# Patient Record
Sex: MALE | Race: BLACK OR AFRICAN AMERICAN | Employment: FULL TIME | URBAN - METROPOLITAN AREA
[De-identification: names, ages, dates, MRNs, and addresses within clinical notes are randomized per-mention and may not be internally consistent; named-entity substitution may affect disease eponyms.]

---

## 2020-11-27 ENCOUNTER — HOSPITAL ENCOUNTER (EMERGENCY)
Age: 50
Discharge: LEFT BEFORE DISCHARGE | DRG: 871 | End: 2020-11-27
Attending: EMERGENCY MEDICINE
Payer: COMMERCIAL

## 2020-11-27 ENCOUNTER — APPOINTMENT (OUTPATIENT)
Dept: CT IMAGING | Age: 50
DRG: 871 | End: 2020-11-27
Payer: COMMERCIAL

## 2020-11-27 ENCOUNTER — APPOINTMENT (OUTPATIENT)
Dept: GENERAL RADIOLOGY | Age: 50
DRG: 871 | End: 2020-11-27
Payer: COMMERCIAL

## 2020-11-27 VITALS
BODY MASS INDEX: 37.42 KG/M2 | RESPIRATION RATE: 20 BRPM | OXYGEN SATURATION: 96 % | HEART RATE: 115 BPM | SYSTOLIC BLOOD PRESSURE: 145 MMHG | TEMPERATURE: 102.7 F | WEIGHT: 246.91 LBS | DIASTOLIC BLOOD PRESSURE: 99 MMHG | HEIGHT: 68 IN

## 2020-11-27 LAB
A/G RATIO: 1 (ref 1.1–2.2)
ALBUMIN SERPL-MCNC: 3.9 G/DL (ref 3.4–5)
ALP BLD-CCNC: 61 U/L (ref 40–129)
ALT SERPL-CCNC: 19 U/L (ref 10–40)
ANION GAP SERPL CALCULATED.3IONS-SCNC: 8 MMOL/L (ref 3–16)
AST SERPL-CCNC: 24 U/L (ref 15–37)
BASOPHILS ABSOLUTE: 0.1 K/UL (ref 0–0.2)
BASOPHILS RELATIVE PERCENT: 1 %
BILIRUB SERPL-MCNC: 0.8 MG/DL (ref 0–1)
BUN BLDV-MCNC: 15 MG/DL (ref 7–20)
CALCIUM SERPL-MCNC: 8.7 MG/DL (ref 8.3–10.6)
CHLORIDE BLD-SCNC: 101 MMOL/L (ref 99–110)
CO2: 26 MMOL/L (ref 21–32)
CREAT SERPL-MCNC: 1.1 MG/DL (ref 0.9–1.3)
D DIMER: 291 NG/ML DDU (ref 0–229)
EKG ATRIAL RATE: 108 BPM
EKG DIAGNOSIS: NORMAL
EKG P AXIS: 50 DEGREES
EKG P-R INTERVAL: 172 MS
EKG Q-T INTERVAL: 322 MS
EKG QRS DURATION: 86 MS
EKG QTC CALCULATION (BAZETT): 431 MS
EKG R AXIS: 20 DEGREES
EKG T AXIS: 190 DEGREES
EKG VENTRICULAR RATE: 108 BPM
EOSINOPHILS ABSOLUTE: 0 K/UL (ref 0–0.6)
EOSINOPHILS RELATIVE PERCENT: 0.1 %
GFR AFRICAN AMERICAN: >60
GFR NON-AFRICAN AMERICAN: >60
GLOBULIN: 3.9 G/DL
GLUCOSE BLD-MCNC: 155 MG/DL (ref 70–99)
HCT VFR BLD CALC: 40.8 % (ref 40.5–52.5)
HEMOGLOBIN: 14.1 G/DL (ref 13.5–17.5)
LACTIC ACID, SEPSIS: 1.1 MMOL/L (ref 0.4–1.9)
LYMPHOCYTES ABSOLUTE: 1.2 K/UL (ref 1–5.1)
LYMPHOCYTES RELATIVE PERCENT: 20.6 %
MCH RBC QN AUTO: 32.5 PG (ref 26–34)
MCHC RBC AUTO-ENTMCNC: 34.4 G/DL (ref 31–36)
MCV RBC AUTO: 94.4 FL (ref 80–100)
MONOCYTES ABSOLUTE: 0.2 K/UL (ref 0–1.3)
MONOCYTES RELATIVE PERCENT: 4 %
NEUTROPHILS ABSOLUTE: 4.3 K/UL (ref 1.7–7.7)
NEUTROPHILS RELATIVE PERCENT: 74.3 %
PDW BLD-RTO: 13.3 % (ref 12.4–15.4)
PLATELET # BLD: 155 K/UL (ref 135–450)
PMV BLD AUTO: 7.5 FL (ref 5–10.5)
POTASSIUM REFLEX MAGNESIUM: 3.6 MMOL/L (ref 3.5–5.1)
RBC # BLD: 4.33 M/UL (ref 4.2–5.9)
SODIUM BLD-SCNC: 135 MMOL/L (ref 136–145)
TOTAL PROTEIN: 7.8 G/DL (ref 6.4–8.2)
TROPONIN: <0.01 NG/ML
WBC # BLD: 5.7 K/UL (ref 4–11)

## 2020-11-27 PROCEDURE — 83605 ASSAY OF LACTIC ACID: CPT

## 2020-11-27 PROCEDURE — 99285 EMERGENCY DEPT VISIT HI MDM: CPT

## 2020-11-27 PROCEDURE — 80053 COMPREHEN METABOLIC PANEL: CPT

## 2020-11-27 PROCEDURE — 85379 FIBRIN DEGRADATION QUANT: CPT

## 2020-11-27 PROCEDURE — 96365 THER/PROPH/DIAG IV INF INIT: CPT

## 2020-11-27 PROCEDURE — 93005 ELECTROCARDIOGRAM TRACING: CPT | Performed by: EMERGENCY MEDICINE

## 2020-11-27 PROCEDURE — 6360000002 HC RX W HCPCS: Performed by: EMERGENCY MEDICINE

## 2020-11-27 PROCEDURE — 87040 BLOOD CULTURE FOR BACTERIA: CPT

## 2020-11-27 PROCEDURE — 93010 ELECTROCARDIOGRAM REPORT: CPT | Performed by: INTERNAL MEDICINE

## 2020-11-27 PROCEDURE — 6360000004 HC RX CONTRAST MEDICATION: Performed by: EMERGENCY MEDICINE

## 2020-11-27 PROCEDURE — 85025 COMPLETE CBC W/AUTO DIFF WBC: CPT

## 2020-11-27 PROCEDURE — 2580000003 HC RX 258: Performed by: EMERGENCY MEDICINE

## 2020-11-27 PROCEDURE — 6370000000 HC RX 637 (ALT 250 FOR IP): Performed by: EMERGENCY MEDICINE

## 2020-11-27 PROCEDURE — 84484 ASSAY OF TROPONIN QUANT: CPT

## 2020-11-27 PROCEDURE — 71045 X-RAY EXAM CHEST 1 VIEW: CPT

## 2020-11-27 PROCEDURE — 71260 CT THORAX DX C+: CPT

## 2020-11-27 RX ORDER — BENZONATATE 100 MG/1
100 CAPSULE ORAL 3 TIMES DAILY PRN
Status: DISCONTINUED | OUTPATIENT
Start: 2020-11-27 | End: 2020-11-27 | Stop reason: HOSPADM

## 2020-11-27 RX ORDER — PROMETHAZINE HYDROCHLORIDE 25 MG/ML
12.5 INJECTION, SOLUTION INTRAMUSCULAR; INTRAVENOUS ONCE
Status: DISCONTINUED | OUTPATIENT
Start: 2020-11-27 | End: 2020-11-27

## 2020-11-27 RX ORDER — ALBUTEROL SULFATE 90 UG/1
2 AEROSOL, METERED RESPIRATORY (INHALATION) 4 TIMES DAILY PRN
Qty: 1 INHALER | Refills: 0 | Status: SHIPPED | OUTPATIENT
Start: 2020-11-27

## 2020-11-27 RX ORDER — METHYLPREDNISOLONE 4 MG/1
TABLET ORAL
Qty: 1 KIT | Refills: 0 | Status: ON HOLD | OUTPATIENT
Start: 2020-11-27 | End: 2020-12-06 | Stop reason: HOSPADM

## 2020-11-27 RX ORDER — 0.9 % SODIUM CHLORIDE 0.9 %
1000 INTRAVENOUS SOLUTION INTRAVENOUS ONCE
Status: COMPLETED | OUTPATIENT
Start: 2020-11-27 | End: 2020-11-27

## 2020-11-27 RX ORDER — AZITHROMYCIN 500 MG/1
500 TABLET, FILM COATED ORAL ONCE
Status: COMPLETED | OUTPATIENT
Start: 2020-11-27 | End: 2020-11-27

## 2020-11-27 RX ORDER — AZITHROMYCIN 250 MG/1
TABLET, FILM COATED ORAL
Qty: 1 PACKET | Refills: 0 | Status: ON HOLD | OUTPATIENT
Start: 2020-11-27 | End: 2020-12-06 | Stop reason: HOSPADM

## 2020-11-27 RX ORDER — CEFUROXIME AXETIL 250 MG/1
250 TABLET ORAL 2 TIMES DAILY
Qty: 14 TABLET | Refills: 0 | Status: ON HOLD | OUTPATIENT
Start: 2020-11-27 | End: 2020-12-06 | Stop reason: HOSPADM

## 2020-11-27 RX ADMIN — IOPAMIDOL 75 ML: 755 INJECTION, SOLUTION INTRAVENOUS at 11:04

## 2020-11-27 RX ADMIN — AZITHROMYCIN MONOHYDRATE 500 MG: 500 TABLET ORAL at 12:16

## 2020-11-27 RX ADMIN — CEFTRIAXONE 1 G: 1 INJECTION, POWDER, FOR SOLUTION INTRAMUSCULAR; INTRAVENOUS at 12:17

## 2020-11-27 RX ADMIN — BENZONATATE 100 MG: 100 CAPSULE ORAL at 09:49

## 2020-11-27 RX ADMIN — SODIUM CHLORIDE 1000 ML: 9 INJECTION, SOLUTION INTRAVENOUS at 09:57

## 2020-11-27 SDOH — HEALTH STABILITY: MENTAL HEALTH: HOW OFTEN DO YOU HAVE A DRINK CONTAINING ALCOHOL?: NEVER

## 2020-11-27 ASSESSMENT — PAIN DESCRIPTION - LOCATION: LOCATION: LEG

## 2020-11-27 ASSESSMENT — PAIN SCALES - GENERAL: PAINLEVEL_OUTOF10: 5

## 2020-11-27 ASSESSMENT — PAIN DESCRIPTION - DESCRIPTORS: DESCRIPTORS: SORE

## 2020-11-27 ASSESSMENT — PAIN DESCRIPTION - ORIENTATION: ORIENTATION: RIGHT;LEFT

## 2020-11-27 NOTE — ED NOTES
D/C: Order noted for d/c. Pt confirmed d/c paperwork 4 rx have correct name. Discharge and education instructions reviewed with patient. Teach-back successful. Pt verbalized understanding and signed d/c papers. Pt denied questions at this time. No acute distress noted. Patient instructed to follow-up as noted - return to emergency department if symptoms worsen. Patient verbalized understanding. Discharged per EDMD with discharge instructions. Pt discharged to private vehicle. Patient stable upon departure. AMA form signed by pt, this nurse and Kassie Sotelo RN as witnesses. Thanked patient for choosing [facility]) for care. Provider aware of patient pain at time of discharge.         Leena Leonardo RN  11/27/20 2337 Ruel Guido RN  11/27/20 0887

## 2020-11-27 NOTE — ED PROVIDER NOTES
[address] [place] [address]      Pt Name: Evelyn Vargas  MRN: [id number]  [place] 1970  Date of evaluation: 11/27/2020  Provider: Edna Silva, [facility]       Chief Complaint   Patient presents with    Cough     tested + for COVID on Tuesday and keeps \"coughing, coughing, coughing\"         HISTORY OF PRESENT ILLNESS   (Location/Symptom, Timing/Onset, Context/Setting, Quality, Duration, Modifying Factors, Severity)  Note limiting factors. Evelyn Vargas is a 48 y.o. male who presents to the emergency department with a complaint of coughing and cough paroxysms. He states that this morning he was unable to stop coughing. He denies any associated shortness of breath or dyspnea on exertion. He does complain of a little bit of pleuritic pain when he coughs. He is known to have COVID-19. He dusted positive as an outpatient on Tuesday, 3 days ago. His symptoms began 7 days ago, last Saturday. Initially he had body aches and generally did not feel well and then developed a cough a couple of days later which has persisted. He denies any sputum production. He has had low-grade fevers throughout his illness. He complains of some persistent body aches. He denies any abdominal pain nausea vomiting or diarrhea. Urine output is normal.  He denies any dysuria hematuria frequency urgency. He denies any headache, earache, sore throat, sinus drainage, neck pain, neck stiffness, or loss of taste or smell. He denies any history of COPD or asthma. He does have hypertension. He states that he is also known in the past to have protein in his urine which has been worked up but the cause is unknown according to the patient. He does not smoke. He denies any history of coronary artery disease or thromboembolic disease. Nursing Notes were reviewed.     HPI        REVIEW OF SYSTEMS    (2-9 systems for level 4, 10 or more for level 5)       HENT: Negative for rhinorrhea and sore throat. Eyes: Negative for redness or drainage. Gastrointestinal: Negative for abdominal pain. Negative for vomiting or diarrhea. Genitourinary: Negative for flank pain. Negative for dysuria. Negative for hematuria. Neurological: Negative for headache. All systems are reviewed and are negative except for those listed above in the history of present illness and ROS. PAST MEDICAL HISTORY     Past Medical History:   Diagnosis Date    Hypertension          SURGICAL HISTORY     History reviewed. No pertinent surgical history. CURRENT MEDICATIONS       Previous Medications    No medications on file       ALLERGIES     Patient has no known allergies. FAMILY HISTORY     History reviewed. No pertinent family history.        SOCIAL HISTORY       Social History     Socioeconomic History    Marital status: Single     Spouse name: None    Number of children: None    Years of education: None    Highest education level: None   Occupational History    None   Social Needs    Financial resource strain: None    Food insecurity     Worry: None     Inability: None    Transportation needs     Medical: None     Non-medical: None   Tobacco Use    Smoking status: Former Smoker    Smokeless tobacco: Never Used   Substance and Sexual Activity    Alcohol use: Not Currently     Frequency: Never    Drug use: Never    Sexual activity: None   Lifestyle    Physical activity     Days per week: None     Minutes per session: None    Stress: None   Relationships    Social connections     Talks on phone: None     Gets together: None     Attends Sabianism service: None     Active member of club or organization: None     Attends meetings of clubs or organizations: None     Relationship status: None    Intimate partner violence     Fear of current or ex partner: None     Emotionally abused: None     Physically abused: None     Forced sexual activity: None   Other Topics Concern    None   Social History Narrative    None       SCREENINGS             PHYSICAL EXAM    (up to 7 for level 4, 8 or more for level 5)     ED Triage Vitals [11/27/20 0926]   BP Temp Temp Source Pulse Resp SpO2 Height Weight   (!) 151/83 97.7 °F (36.5 °C) Oral 109 16 96 % 5' 8\" (1.727 m) 246 lb 14.6 oz (112 kg)         Physical Exam   Constitutional: Awake and alert. Nontoxic. Not ill-appearing. Head: No visible evidence of trauma. Normocephalic. Eyes: Pupils equal and reactive. No photophobia. Conjunctiva normal.    HENT: Oral mucosa moist.  Airway patent. Pharynx without erythema. Nares were clear. Neck:  Soft and supple. Nontender. Heart:  Regular rate and rhythm. No murmur. Lungs:  Clear to auscultation. No wheezes, rales, or ronchi. Coughing frequently. No conversational dyspnea or accessory muscle use. Chest: Chest wall non-tender. No evidence of trauma. Abdomen:  Soft, nondistended, bowel sounds present. Nontender. No guarding rigidity or rebound. No masses. Musculoskeletal: Extremities non-tender with full range of motion. Radial and dorsalis pedis pulses were intact. No calf tenderness erythema or edema. Neurological: Alert and oriented x 3. Speech clear. No facial droop. No acute focal motor or sensory deficits. Skin: Skin is warm and dry. No rash. Lymphatic:  No lympadenopathy. Psychiatric: Normal mood and affect. Behavior is normal.         DIAGNOSTIC RESULTS     EKG: All EKG's are interpreted by the Emergency Department Physician who either signs or Co-signs this chart in the absence of a cardiologist.    Sinus tachycardia. Rate 108. NJ interval 172 ms. QRS duration 86 ms. QTc 431 ms. Axis XX degrees. No ST elevation. T wave inversion noted in the lateral leads. Early repolarization. Left ventricular hypertrophy. There are no old EKGs available for comparison.     RADIOLOGY:   Non-plain film images such as CT, Ultrasound and MRI are read by the radiologist. Plain radiographic images are visualized and preliminarily interpreted by the emergency physician with the below findings:        Interpretation per the Radiologist below, if available at the time of this note:    CT CHEST PULMONARY EMBOLISM W CONTRAST   Preliminary Result   Negative study for pulmonary embolism. Scattered areas of ground-glass opacity, many of which have a somewhat   rounded appearance, to the lungs bilaterally involving all lobes. Many of   these also demonstrate superimposed crazy paving pattern. Commonly reported   imaging features of COVID-19 pneumonia are present. Other processes such as   influenza pneumonia and organizing pneumonia, as can be seen with drug   toxicity and connective tissue disease, can cause a similar imaging pattern. PneTyp      Small pericardial effusion. XR CHEST PORTABLE   Preliminary Result   1. Moderate cardiomegaly which has increased from previous exam of October 2015. No evidence of pulmonary edema. 2. Suspected subtle patchy peripheral opacities involving the right lung   which could indicate an evolving pneumonia. Radiographic follow-up   recommended.                ED BEDSIDE ULTRASOUND:   Performed by ED Physician - none    LABS:  Labs Reviewed   COMPREHENSIVE METABOLIC PANEL W/ REFLEX TO MG FOR LOW K - Abnormal; Notable for the following components:       Result Value    Sodium 135 (*)     Glucose 155 (*)     Albumin/Globulin Ratio 1.0 (*)     All other components within normal limits    Narrative:     Performed at:  [facility]  [address] [place], [place] [address]   Phone [phone]   D-DIMER, QUANTITATIVE - Abnormal; Notable for the following components:    D-Dimer, Quant 291 (*)     All other components within normal limits    Narrative:     Performed at:  [facility]  [address] [place], [place] [address]   Phone [phone]   CULTURE, BLOOD 1 CULTURE, BLOOD 2   CBC WITH AUTO DIFFERENTIAL    Narrative:     Performed at:  [facility]  [address] [place] [address]   Phone [phone]   TROPONIN    Narrative:     Performed at:  [facility]  [address] [place] [address]   Phone [phone]   LACTATE, SEPSIS    Narrative:     Performed at:  [facility]  [address]  [place] [facility] [address]   Phone [phone]   LACTATE, SEPSIS       All other labs were within normal range or not returned as of this dictation. EMERGENCY DEPARTMENT COURSE and DIFFERENTIAL DIAGNOSIS/MDM:   Vitals:    Vitals:    11/27/20 1120 11/27/20 1121 11/27/20 1122 11/27/20 1123   BP: (!) 158/99      Pulse:       Resp:       Temp:       TempSrc:       SpO2: 98% 97% 96% 96%   Weight:       Height:             MDM      The patient presents with cough and cough paroxysms with persistent symptoms after recently been diagnosed with Covid. He is mildly tachycardic with a heart rate of 109. Oxygen saturation is 96%. He is alert and oriented x3. Lungs are clear to auscultation and there is no evidence of any respiratory distress, conversational dyspnea, tachypnea, or accessory muscle use. He is coughing occasionally. He was given Tessalon, 1 tablet in the emergency department. He was also given IV fluids for hydration. REASSESSMENT          10:53 AM: Chest x-ray is suspicious for groundglass infiltrates consistent with COVID-19. D-dimer is elevated. CT chest PE protocol was ordered. 11:36 AM: CT chest was reviewed. There is no evidence of pulmonary embolus. There are atypical appearing infiltrates present in all lobes consistent with multifocal pneumonia. This is consistent with COVID-19. Patient was reexamined. He remains tachycardic with a heart rate of 110. Lactate is 1.1. Troponin is normal.  The patient was reexamined.     I advised the patient to be admitted for IV antibiotics, observation, further treatment and evaluation based on risk of decompensation, given the presence of multifocal pneumonia. He was adamant that he does not want to be admitted. He is competent to make this decision. He demonstrated appropriate decision-making capacity. I advised him of the risk of leaving [address] including but not limited to the possibility of, worsening of his condition, permanent disability, respiratory failure, and/or death. Verbalized understanding of the risk. He states that he will return if he develops any chest pain shortness of breath or worsening symptoms. He will be placed on a Medrol Dosepak, Ceftin, Zithromax. He will also be given a prescription for albuterol and Tessalon. You are advised to self isolate for 14 days or until COVID-19 test is known to be negative. You are advised to stay home and do not leave the house unless absolutely necessary. If you do need to leave the house for an urgent reason, you must wear a mask at all times. Follow-up with a primary care physician by telephone within 1-2 business days to discuss whether or not you need a in person follow-up visit. Make sure that you wear a mask if a follow-up visit is required. Make sure that you review the COVID-19 isolation instructions and COVID-19 general instructions that are attached. Watch for chest pain, shortness of breath, or worsening symptoms. If condition worsens or new symptoms develop, return immediately to the emergency department. Drink plenty of fluids. Recommend Tylenol or ibuprofen as directed for pain or fever. CRITICAL CARE TIME   Total Critical Care time was 0 minutes, excluding separately reportable procedures. There was a high probability of clinically significant/life threatening deterioration in the patient's condition which required my urgent intervention.       CONSULTS:  None    PROCEDURES:  Unless

## 2020-11-27 NOTE — ED TRIAGE NOTES
Pt arrived to ED via private vehicle for a complaint of cough. Pt states that he coughs up white mucus once in a while. Pt tested positive for COVID on Tuesday. Pt states that he has been coughing excessively since this morning. Pt states that he has mild pain in his chest from coughing that comes and goes. Pt denies any nausea or vomiting at this time. Pt denies any pertinent medical hx besides hypertension and is compliant with his medication. VS noted, blood pressure elevated and stable. Patient A&Ox4. Respirations easy and unlabored. Skin warm and dry and appropriate for ethnicity. No acute distress noted at this time. Patient placed on continuous telemetry and pulse ox upon arrival to room.

## 2020-11-30 ENCOUNTER — APPOINTMENT (OUTPATIENT)
Dept: GENERAL RADIOLOGY | Age: 50
DRG: 871 | End: 2020-11-30
Payer: COMMERCIAL

## 2020-11-30 ENCOUNTER — HOSPITAL ENCOUNTER (INPATIENT)
Age: 50
LOS: 6 days | Discharge: HOME | DRG: 871 | End: 2020-12-06
Attending: EMERGENCY MEDICINE | Admitting: INTERNAL MEDICINE
Payer: COMMERCIAL

## 2020-11-30 PROBLEM — J96.00 ACUTE RESPIRATORY FAILURE DUE TO COVID-19 (HCC): Status: ACTIVE | Noted: 2020-11-30

## 2020-11-30 PROBLEM — U07.1 ACUTE RESPIRATORY FAILURE DUE TO COVID-19 (HCC): Status: ACTIVE | Noted: 2020-11-30

## 2020-11-30 LAB
A/G RATIO: 0.8 (ref 1.1–2.2)
ALBUMIN SERPL-MCNC: 3.6 G/DL (ref 3.4–5)
ALP BLD-CCNC: 56 U/L (ref 40–129)
ALT SERPL-CCNC: 18 U/L (ref 10–40)
ANION GAP SERPL CALCULATED.3IONS-SCNC: 11 MMOL/L (ref 3–16)
AST SERPL-CCNC: 26 U/L (ref 15–37)
BASOPHILS ABSOLUTE: 0 K/UL (ref 0–0.2)
BASOPHILS RELATIVE PERCENT: 0.3 %
BILIRUB SERPL-MCNC: 0.6 MG/DL (ref 0–1)
BUN BLDV-MCNC: 24 MG/DL (ref 7–20)
C-REACTIVE PROTEIN: 65.1 MG/L (ref 0–5.1)
CALCIUM SERPL-MCNC: 9.2 MG/DL (ref 8.3–10.6)
CHLORIDE BLD-SCNC: 97 MMOL/L (ref 99–110)
CO2: 27 MMOL/L (ref 21–32)
CREAT SERPL-MCNC: 1.3 MG/DL (ref 0.9–1.3)
D DIMER: 548 NG/ML DDU (ref 0–229)
EOSINOPHILS ABSOLUTE: 0 K/UL (ref 0–0.6)
EOSINOPHILS RELATIVE PERCENT: 0 %
FERRITIN: 606.3 NG/ML (ref 30–400)
GFR AFRICAN AMERICAN: >60
GFR NON-AFRICAN AMERICAN: 58
GLOBULIN: 4.7 G/DL
GLUCOSE BLD-MCNC: 137 MG/DL (ref 70–99)
HCT VFR BLD CALC: 42 % (ref 40.5–52.5)
HEMOGLOBIN: 14.2 G/DL (ref 13.5–17.5)
LACTATE DEHYDROGENASE: 431 U/L (ref 100–190)
LACTIC ACID: 2 MMOL/L (ref 0.4–2)
LYMPHOCYTES ABSOLUTE: 0.7 K/UL (ref 1–5.1)
LYMPHOCYTES RELATIVE PERCENT: 5 %
MCH RBC QN AUTO: 32.1 PG (ref 26–34)
MCHC RBC AUTO-ENTMCNC: 33.9 G/DL (ref 31–36)
MCV RBC AUTO: 94.6 FL (ref 80–100)
MONOCYTES ABSOLUTE: 0.6 K/UL (ref 0–1.3)
MONOCYTES RELATIVE PERCENT: 4.3 %
NEUTROPHILS ABSOLUTE: 13 K/UL (ref 1.7–7.7)
NEUTROPHILS RELATIVE PERCENT: 90.4 %
PDW BLD-RTO: 12.9 % (ref 12.4–15.4)
PLATELET # BLD: 200 K/UL (ref 135–450)
PMV BLD AUTO: 8 FL (ref 5–10.5)
POTASSIUM REFLEX MAGNESIUM: 4 MMOL/L (ref 3.5–5.1)
PROCALCITONIN: 0.42 NG/ML (ref 0–0.15)
RBC # BLD: 4.43 M/UL (ref 4.2–5.9)
SODIUM BLD-SCNC: 135 MMOL/L (ref 136–145)
TOTAL PROTEIN: 8.3 G/DL (ref 6.4–8.2)
WBC # BLD: 14.3 K/UL (ref 4–11)

## 2020-11-30 PROCEDURE — 6360000002 HC RX W HCPCS: Performed by: INTERNAL MEDICINE

## 2020-11-30 PROCEDURE — 84145 PROCALCITONIN (PCT): CPT

## 2020-11-30 PROCEDURE — 99285 EMERGENCY DEPT VISIT HI MDM: CPT

## 2020-11-30 PROCEDURE — 96374 THER/PROPH/DIAG INJ IV PUSH: CPT

## 2020-11-30 PROCEDURE — 6370000000 HC RX 637 (ALT 250 FOR IP): Performed by: INTERNAL MEDICINE

## 2020-11-30 PROCEDURE — 2580000003 HC RX 258: Performed by: EMERGENCY MEDICINE

## 2020-11-30 PROCEDURE — 2500000003 HC RX 250 WO HCPCS: Performed by: EMERGENCY MEDICINE

## 2020-11-30 PROCEDURE — 71045 X-RAY EXAM CHEST 1 VIEW: CPT

## 2020-11-30 PROCEDURE — 80053 COMPREHEN METABOLIC PANEL: CPT

## 2020-11-30 PROCEDURE — 85025 COMPLETE CBC W/AUTO DIFF WBC: CPT

## 2020-11-30 PROCEDURE — 83615 LACTATE (LD) (LDH) ENZYME: CPT

## 2020-11-30 PROCEDURE — 96361 HYDRATE IV INFUSION ADD-ON: CPT

## 2020-11-30 PROCEDURE — 85379 FIBRIN DEGRADATION QUANT: CPT

## 2020-11-30 PROCEDURE — 2580000003 HC RX 258: Performed by: INTERNAL MEDICINE

## 2020-11-30 PROCEDURE — 86140 C-REACTIVE PROTEIN: CPT

## 2020-11-30 PROCEDURE — 82728 ASSAY OF FERRITIN: CPT

## 2020-11-30 PROCEDURE — 2500000003 HC RX 250 WO HCPCS: Performed by: INTERNAL MEDICINE

## 2020-11-30 PROCEDURE — 83605 ASSAY OF LACTIC ACID: CPT

## 2020-11-30 PROCEDURE — 36415 COLL VENOUS BLD VENIPUNCTURE: CPT

## 2020-11-30 PROCEDURE — 1200000000 HC SEMI PRIVATE

## 2020-11-30 PROCEDURE — 6370000000 HC RX 637 (ALT 250 FOR IP): Performed by: EMERGENCY MEDICINE

## 2020-11-30 RX ORDER — AMLODIPINE BESYLATE 5 MG/1
10 TABLET ORAL DAILY
Status: DISCONTINUED | OUTPATIENT
Start: 2020-12-01 | End: 2020-12-06 | Stop reason: HOSPADM

## 2020-11-30 RX ORDER — GABAPENTIN 300 MG/1
300 CAPSULE ORAL 2 TIMES DAILY
Status: CANCELLED | OUTPATIENT
Start: 2020-11-30

## 2020-11-30 RX ORDER — POLYETHYLENE GLYCOL 3350 17 G/17G
17 POWDER, FOR SOLUTION ORAL DAILY PRN
Status: DISCONTINUED | OUTPATIENT
Start: 2020-11-30 | End: 2020-12-06 | Stop reason: HOSPADM

## 2020-11-30 RX ORDER — MAGNESIUM SULFATE IN WATER 40 MG/ML
2 INJECTION, SOLUTION INTRAVENOUS PRN
Status: DISCONTINUED | OUTPATIENT
Start: 2020-11-30 | End: 2020-12-06 | Stop reason: HOSPADM

## 2020-11-30 RX ORDER — DEXAMETHASONE 6 MG/1
6 TABLET ORAL EVERY 12 HOURS SCHEDULED
Status: DISCONTINUED | OUTPATIENT
Start: 2020-11-30 | End: 2020-12-01

## 2020-11-30 RX ORDER — ALBUTEROL SULFATE 90 UG/1
2 AEROSOL, METERED RESPIRATORY (INHALATION) 4 TIMES DAILY PRN
Status: DISCONTINUED | OUTPATIENT
Start: 2020-11-30 | End: 2020-12-06 | Stop reason: HOSPADM

## 2020-11-30 RX ORDER — ONDANSETRON 2 MG/ML
4 INJECTION INTRAMUSCULAR; INTRAVENOUS EVERY 6 HOURS PRN
Status: DISCONTINUED | OUTPATIENT
Start: 2020-11-30 | End: 2020-12-06 | Stop reason: HOSPADM

## 2020-11-30 RX ORDER — 0.9 % SODIUM CHLORIDE 0.9 %
1000 INTRAVENOUS SOLUTION INTRAVENOUS ONCE
Status: COMPLETED | OUTPATIENT
Start: 2020-11-30 | End: 2020-11-30

## 2020-11-30 RX ORDER — ACETAMINOPHEN 325 MG/1
650 TABLET ORAL EVERY 4 HOURS PRN
Status: DISCONTINUED | OUTPATIENT
Start: 2020-11-30 | End: 2020-12-06 | Stop reason: HOSPADM

## 2020-11-30 RX ORDER — LISINOPRIL 40 MG/1
40 TABLET ORAL DAILY
COMMUNITY

## 2020-11-30 RX ORDER — LABETALOL HYDROCHLORIDE 5 MG/ML
10 INJECTION, SOLUTION INTRAVENOUS ONCE
Status: COMPLETED | OUTPATIENT
Start: 2020-11-30 | End: 2020-11-30

## 2020-11-30 RX ORDER — GABAPENTIN 300 MG/1
300 CAPSULE ORAL 2 TIMES DAILY
COMMUNITY
End: 2020-11-30

## 2020-11-30 RX ORDER — SODIUM CHLORIDE 0.9 % (FLUSH) 0.9 %
10 SYRINGE (ML) INJECTION EVERY 12 HOURS SCHEDULED
Status: DISCONTINUED | OUTPATIENT
Start: 2020-11-30 | End: 2020-12-06 | Stop reason: HOSPADM

## 2020-11-30 RX ORDER — VITAMIN B COMPLEX
1000 TABLET ORAL DAILY
Status: DISCONTINUED | OUTPATIENT
Start: 2020-11-30 | End: 2020-12-06 | Stop reason: HOSPADM

## 2020-11-30 RX ORDER — BENZONATATE 100 MG/1
100 CAPSULE ORAL 3 TIMES DAILY PRN
Status: DISCONTINUED | OUTPATIENT
Start: 2020-11-30 | End: 2020-12-06 | Stop reason: HOSPADM

## 2020-11-30 RX ORDER — POTASSIUM CHLORIDE 20 MEQ/1
40 TABLET, EXTENDED RELEASE ORAL PRN
Status: DISCONTINUED | OUTPATIENT
Start: 2020-11-30 | End: 2020-12-06 | Stop reason: HOSPADM

## 2020-11-30 RX ORDER — ASCORBIC ACID 500 MG
1000 TABLET ORAL 3 TIMES DAILY
Status: DISCONTINUED | OUTPATIENT
Start: 2020-11-30 | End: 2020-12-06 | Stop reason: HOSPADM

## 2020-11-30 RX ORDER — POTASSIUM CHLORIDE 7.45 MG/ML
10 INJECTION INTRAVENOUS PRN
Status: DISCONTINUED | OUTPATIENT
Start: 2020-11-30 | End: 2020-12-06 | Stop reason: HOSPADM

## 2020-11-30 RX ORDER — ACETAMINOPHEN 650 MG/1
650 SUPPOSITORY RECTAL EVERY 4 HOURS PRN
Status: DISCONTINUED | OUTPATIENT
Start: 2020-11-30 | End: 2020-12-06 | Stop reason: HOSPADM

## 2020-11-30 RX ORDER — SODIUM CHLORIDE 0.9 % (FLUSH) 0.9 %
10 SYRINGE (ML) INJECTION PRN
Status: DISCONTINUED | OUTPATIENT
Start: 2020-11-30 | End: 2020-12-06 | Stop reason: HOSPADM

## 2020-11-30 RX ORDER — PROMETHAZINE HYDROCHLORIDE 25 MG/1
12.5 TABLET ORAL EVERY 6 HOURS PRN
Status: DISCONTINUED | OUTPATIENT
Start: 2020-11-30 | End: 2020-12-06 | Stop reason: HOSPADM

## 2020-11-30 RX ORDER — ACETAMINOPHEN 325 MG/1
650 TABLET ORAL EVERY 6 HOURS PRN
Status: DISCONTINUED | OUTPATIENT
Start: 2020-11-30 | End: 2020-11-30

## 2020-11-30 RX ORDER — ACETAMINOPHEN 650 MG/1
650 SUPPOSITORY RECTAL EVERY 6 HOURS PRN
Status: DISCONTINUED | OUTPATIENT
Start: 2020-11-30 | End: 2020-11-30

## 2020-11-30 RX ORDER — 0.9 % SODIUM CHLORIDE 0.9 %
30 INTRAVENOUS SOLUTION INTRAVENOUS PRN
Status: DISCONTINUED | OUTPATIENT
Start: 2020-11-30 | End: 2020-12-06 | Stop reason: HOSPADM

## 2020-11-30 RX ORDER — AMLODIPINE BESYLATE 10 MG/1
10 TABLET ORAL DAILY
COMMUNITY

## 2020-11-30 RX ORDER — GUAIFENESIN/DEXTROMETHORPHAN 100-10MG/5
5 SYRUP ORAL EVERY 4 HOURS PRN
Status: DISCONTINUED | OUTPATIENT
Start: 2020-11-30 | End: 2020-12-06 | Stop reason: HOSPADM

## 2020-11-30 RX ORDER — ACETAMINOPHEN 500 MG
1000 TABLET ORAL ONCE
Status: COMPLETED | OUTPATIENT
Start: 2020-11-30 | End: 2020-11-30

## 2020-11-30 RX ORDER — LISINOPRIL 40 MG/1
40 TABLET ORAL DAILY
Status: DISCONTINUED | OUTPATIENT
Start: 2020-12-01 | End: 2020-12-06 | Stop reason: HOSPADM

## 2020-11-30 RX ADMIN — DEXAMETHASONE 6 MG: 6 TABLET ORAL at 21:41

## 2020-11-30 RX ADMIN — ACETAMINOPHEN 1000 MG: 500 TABLET ORAL at 12:17

## 2020-11-30 RX ADMIN — SODIUM CHLORIDE 1000 ML: 9 INJECTION, SOLUTION INTRAVENOUS at 11:52

## 2020-11-30 RX ADMIN — Medication 1000 UNITS: at 17:59

## 2020-11-30 RX ADMIN — LABETALOL HYDROCHLORIDE 10 MG: 5 INJECTION INTRAVENOUS at 11:52

## 2020-11-30 RX ADMIN — ACETAMINOPHEN 650 MG: 325 TABLET ORAL at 20:22

## 2020-11-30 RX ADMIN — OXYCODONE HYDROCHLORIDE AND ACETAMINOPHEN 1000 MG: 500 TABLET ORAL at 17:59

## 2020-11-30 RX ADMIN — REMDESIVIR 200 MG: 5 INJECTION INTRAVENOUS at 17:59

## 2020-11-30 RX ADMIN — ENOXAPARIN SODIUM 40 MG: 40 INJECTION SUBCUTANEOUS at 17:59

## 2020-11-30 RX ADMIN — Medication 10 ML: at 21:42

## 2020-11-30 ASSESSMENT — PAIN SCALES - GENERAL
PAINLEVEL_OUTOF10: 0

## 2020-11-30 NOTE — ED PROVIDER NOTES
[facility]      Pt Name: Linda Joyner  MRN: [id number]  [place] 1970  Date of evaluation: 11/30/2020  Provider: Adamaris Jewell, [address] [place] [address]  Chief Complaint   Patient presents with    Fatigue     covid + a wk ago        I wore personal protective equipment when I was in the room the entire time. This includes gloves, N95 mask, face shield, and a glove over my stethoscope for protection. HPI  Linda Joyner is a 48 y.o. male who presents with generalized weakness this been present for 1 week he was seen here 1 week ago and diagnosed with Covid at that time. He has had increasing dyspnea on exertion. .  He has had fevers. Denies any headache. Is been coughing but has been nonproductive. He states that if he walks across the room he gets short of breath. Nothing makes it better or worse. He describes it as moderate to severe. He denies any previous history of lung problems. He does have a history of hypertension and took his medications this morning. ? REVIEW OF SYSTEMS  All systems negative except as noted in the HPI. Reviewed Nurses' notes and concur. No LMP for male patient. PAST MEDICAL HISTORY  Past Medical History:   Diagnosis Date    Hypertension        FAMILY HISTORY  History reviewed. No pertinent family history. SOCIAL HISTORY   reports that he has quit smoking. He has never used smokeless tobacco. He reports previous alcohol use. He reports that he does not use drugs. SURGICAL HISTORY  History reviewed. No pertinent surgical history.     CURRENT MEDICATIONS  Current Outpatient Rx   Medication Sig Dispense Refill    cefUROXime (CEFTIN) 250 MG tablet Take 1 tablet by mouth 2 times daily for 7 days 14 tablet 0    azithromycin (ZITHROMAX Z-PAULINO) 250 MG tablet Take 2 tablets (500 mg) on Day 1, and then take 1 tablet (250 mg) on days 2 through 5. 1 packet 0    albuterol sulfate HFA (VENTOLIN HFA) 108 (90 Base) MCG/ACT inhaler Inhale 2 puffs into the lungs 4 times daily as needed for Wheezing 1 Inhaler 0    methylPREDNISolone (MEDROL, PAULINO,) 4 MG tablet Take by mouth. 1 kit 0       ALLERGIES  No Known Allergies    PHYSICAL EXAM  VITAL SIGNS: BP (!) 156/91   Pulse 113   Temp 101.1 °F (38.4 °C) (Oral)   Resp (!) 39   Ht 5' 8\" (1.727 m)   Wt 239 lb 3.2 oz (108.5 kg)   SpO2 95%   BMI 36.37 kg/m²   Constitutional: Well-developed, well-nourished, appears normal, nontoxic, activity: Resting comfortably on the cart, no obvious pain  HENT: Normocephalic, Atraumatic, Bilateral external ears normal, Oropharynx moist, no pharyngeal exudates, no pharyngeal erythema, no nasal discharge. Eyes: PERRLA, Conjunctiva normal, No discharge. Neck: Normal range of motion, no tenderness, Supple, no adenopathy. Cardiovascular: Moderately tachycardic heart rate, Normal rhythm, no murmurs, no gallops, no rubs. Thorax & Lungs: Decreased breath sounds, no respiratory distress, no wheezing, no rales, no rhonchi  Abdomen: Soft, no tender with no distension, no masses, no pulsatile masses, no hepatosplenomegaly, normal bowel sounds  Skin: Warm, Dry, No erythema, no rash. Extremities: No edema, No tenderness, No cyanosis, No clubbing. No amputations, capillary refill less than 2 seconds.   Neurologic: Alert & oriented x 3    LABORATORY  Labs Reviewed   CBC WITH AUTO DIFFERENTIAL - Abnormal; Notable for the following components:       Result Value    WBC 14.3 (*)     Neutrophils Absolute 13.0 (*)     Lymphocytes Absolute 0.7 (*)     All other components within normal limits    Narrative:     Performed at:  [facility] [address] [place], [place] [address]   Phone [phone]   COMPREHENSIVE METABOLIC PANEL W/ REFLEX TO MG FOR LOW K - Abnormal; Notable for the following components:    Sodium 135 (*)     Chloride 97 (*)     Glucose 137 (*)     BUN 24 (*)     GFR Non- 58 (*) Total Protein 8.3 (*)     Albumin/Globulin Ratio 0.8 (*)     All other components within normal limits    Narrative:     Performed at:  [facility]  [address] [facility] [address]   Phone [phone]   LACTIC ACID, PLASMA    Narrative:     Performed at:  [facility]  [address] [facility] [address]   Phone [phone] RADIOLOGY/PROCEDURES  I personally reviewed the images for this case. XR CHEST PORTABLE   Final Result   Increasing bilateral airspace disease as compared to prior, compatible with   worsening pneumonia given patient history of COVID-19 infection. COURSE & MEDICAL DECISION MAKING  Pertinent Labs & Imaging studies reviewed. (See chart for details)    Vitals:    11/30/20 1147 11/30/20 1152 11/30/20 1202 11/30/20 1215   BP: (!) 134/94  (!) 152/93 (!) 156/91   Pulse: 124 127 112 113   Resp: (!) 44 (!) 38 (!) 43 (!) 39   Temp:    101.1 °F (38.4 °C)   TempSrc:    Oral   SpO2: 91% (!) 88% 94% 95%   Weight:       Height:           Medications   0.9 % sodium chloride bolus (1,000 mLs Intravenous New Bag 11/30/20 1152)   labetalol (NORMODYNE;TRANDATE) injection 10 mg (10 mg Intravenous Given 11/30/20 1152)   acetaminophen (TYLENOL) tablet 1,000 mg (1,000 mg Oral Given 11/30/20 1217)       New Prescriptions    No medications on file       SEP-1 CORE MEASURE DATA  Classification: sepsis  Amount of fluids ordered: Patient is hypertensive and 1 L was ordered. Time at which sepsis was identified: 1200  Broad-spectrum antibiotics chosen: Viral sepsis  Repeat lactate level: pending    On reassessment after fluid resuscitation:  pending, to be completed by inpatient team    Patient remained stable in the ED. his pulse ox went down to 88% on room air at rest.  Therefore, oxygen was placed on the patient at 2 L/min nasal cannula and he came up to 93 to 94%.   His laboratory work indicated a white count of 14.3 and his chest x-ray shows increasing infiltrates bilaterally. He was diagnosed with Covid 1 week ago. Therefore, patient was admitted to the hospital for further evaluation treatment for oxygen therapy. Hospitalist was notified at [phone]. The patient's blood pressure was found to be elevated according to CMS/Medicare and the Affordable Care Act/ObamaCare criteria. Elevated blood pressure could occur because of pain or anxiety or other reasons and does not mean that they need to have their blood pressure treated or medications otherwise adjusted. However, this could also be a sign that they will need to have their blood pressure treated or medications changed. The patient was instructed to follow up closely with their personal physician to have their blood pressure rechecked. The patient was instructed to take a list of recent blood pressure readings to their next visit with their personal physician. I reviewed old records     (This chart has been completed using [address]. Although attempts have been made to ensure accuracy, words and/or phrases may not be transcribed as intended.)    Patient refused pain medicines at the time of their exam.    IMPRESSION(S):  1. Pneumonia due to COVID-19 virus    2. Hypoxia    3. Generalized weakness    4. Viral sepsis ([facility] [address]        ?   Recheck Times: 4691, 5505  Critical Care Time: 35 minutes not including billable procedures    Diagnostic considerations include but are not limited to:  Acute Coronary Syndrome, Congestive Heart Failure, Myocardial Infarction, Pulmonary Embolus, Pneumonia, Pneumothorax, sepsis, DKA, airway obstruction, bronchitis, burn injury, other         Sadi Saini DO  11/30/20 0040

## 2020-11-30 NOTE — PROGRESS NOTES
Medication Reconciliation    List of medications patient is currently taking is complete. Source of information: 1. Conversation with patient at bedside                                      2. EPIC records      Allergies  Patient has no known allergies. Notes regarding home medications:   1. Patient received all of his morning home medications prior to arrival to the emergency department today.     Evens Khan PharmD, BCPS  11/30/2020 1:00 PM

## 2020-11-30 NOTE — PROGRESS NOTES
Medication Reconciliation    List of medications patient is currently taking is complete. Source of information: 1. Conversation with patient at bedside                                       2. EPIC records      Allergies  Patient has no known allergies. Notes regarding home medications:   1. Patient received all of his morning home medications prior to arrival to the emergency department today.     Roberto Carlos Brooks PharmD, BCPS  11/30/2020 1:00 PM

## 2020-11-30 NOTE — H&P
Positive as follows:    History reviewed. No pertinent family history. REVIEW OF SYSTEMS:   Positive review  noted in the HPI. All other systems reviewed and negative.     PHYSICAL EXAM:    BP (!) 150/91   Pulse 114   Temp 101.1 °F (38.4 °C) (Oral)   Resp (!) 36   Ht 5' 8\" (1.727 m)   Wt 239 lb 3.2 oz (108.5 kg)   SpO2 94%   BMI 36.37 kg/m²   General Appearance: alert and oriented to person, place and time, well developed and well- nourished, in no acute distress  Skin: warm and dry, no rash or erythema  Head: normocephalic and atraumatic  Eyes: pupils equal, round, and reactive to light, extraocular eye movements intact, conjunctivae normal  ENT: tympanic membrane, external ear and ear canal normal bilaterally, nose without deformity, nasal mucosa and turbinates normal without polyps  Neck: supple and non-tender without mass, no thyromegaly or thyroid nodules, no cervical lymphadenopathy  Pulmonary/Chest: clear to auscultation bilaterally- no wheezes, rales or rhonchi, normal air movement, no respiratory distress  Cardiovascular: normal rate, regular rhythm, normal S1 and S2, no murmurs, rubs, clicks, or gallops, Peripheral pulses good, Cap refill <3 sec, no carotid bruits  Abdomen: soft, non-tender, non-distended, normal bowel sounds, no masses or organomegaly  Extremities: no cyanosis, clubbing or edema  Musculoskeletal: normal range of motion, no joint swelling, deformity or tenderness  Neurologic: reflexes normal and symmetric, no cranial nerve deficit, gait, coordination and speech normal      LABS:    CXR:  I have reviewed the CXR with the following interpretation: Multifocal pneumonia        CBC   Recent Labs     11/30/20  1149   WBC 14.3*   HGB 14.2   HCT 42.0         RENAL  Recent Labs     11/30/20  1149   *   K 4.0   CL 97*   CO2 27   BUN 24*   CREATININE 1.3     LFT'S  Recent Labs     11/30/20  1149   AST 26   ALT 18   BILITOT 0.6   ALKPHOS 56     COAG  No results for input(s): INR in the last 72 hours. CARDIAC ENZYMES  No results for input(s): CKTOTAL, CKMB, CKMBINDEX, TROPONINI in the last 72 hours. U/A:  No results found for: NITRITE, COLORU, WBCUA, RBCUA, MUCUS, BACTERIA, CLARITYU, SPECGRAV, LEUKOCYTESUR, BLOODU, GLUCOSEU, AMORPHOUS    ABG  No results found for: FDC7JPF, BEART, W0GHNOJR, PHART, THGBART, EMI7XVQ, PO2ART, SHJ0TXZ    UA:No results for input(s): NITRITE, COLORU, PHUR, LABCAST, [address], RBCUA, MUCUS, TRICHOMONAS, YEAST, BACTERIA, CLARITYU, SPECGRAV, LEUKOCYTESUR, UROBILINOGEN, BILIRUBINUR, BLOODU, GLUCOSEU, KETUA, AMORPHOUS in the last 72 hours. Microbiology:  No results for input(s): LABGRAM, LABANAE, ORG, CXSURG in the last 72 hours. Nasal Culture: No results for input(s): ORG, MRSAPCR in the last 72 hours. Blood Culture: No results for input(s): BC, BLOODCULT2, ORG in the last 72 hours. Fungal Culture:   No results for input(s): FUNGSM in the last 72 hours. No results for input(s): FUNCXBLD in the last 72 hours. CSF Culture:  No results for input(s): COLORCSF, APPEARCSF, CFTUBE, CLOTCSF, WBCCSF, RBCCSF, NEUTCSF, NUMCELLSCSF, LYMPHSCSF, MONOCSF, GLUCCSF, VOLCSF in the last 72 hours. Respiratory Culture:  No results for input(s): Dasie Enrique in the last 72 hours. AFB:No results for input(s): AFBSMEAR in the last 72 hours. Urine Culture  No results for input(s): LABURIN in the last 72 hours. RADIOLOGY:    XR CHEST PORTABLE   Final Result   Increasing bilateral airspace disease as compared to prior, compatible with   worsening pneumonia given patient history of COVID-19 infection.              Previous medical records personally reviewed and analyzed         PHYSICIAN CERTIFICATION    I certify that Tamia Feng is expected to be hospitalized for >2 midnights based on the following assessment and plan:    ASSESSMENT/PLAN:  Active Hospital Problems    Diagnosis Date Noted    Acute respiratory failure due to [facility]19 ([facility] [U07.1, J96.00] 11/30/2020     Acute hypoxic respiratory failure  -Secondary to Covid pneumonia  -Wean as tolerated    COVID-19 multifocal pneumonia  -Patient was on a Medrol Dosepak, will continue dexamethasone  -Start remdesivir  -If oxygen requirement was worse will start convalescent plasma  -Procalcitonin ordered  -Inflammatory markers ordered    Sepsis present on admission  -With leukocytosis and tachycardia  -No IV fluids due to chance of ARDS  -Worsening leukocytosis possibly due to the fact the patient was on steroids at home    Hypertension  -Continue home medicine and monitor blood pressure    DVT Prophylaxis: Lovenox  Diet: No diet orders on file  Code Status: No Order      Shayy Croft MD  The note was completed using EMR. Every effort was made to ensure accuracy; however, inadvertent computerized transcription errors may be present. Thank you No primary care provider on file. for the opportunity to be involved in this patient's care. If you have any questions or concerns please feel free to contact me at [phone].

## 2020-12-01 LAB
ABO/RH: NORMAL
ANION GAP SERPL CALCULATED.3IONS-SCNC: 8 MMOL/L (ref 3–16)
ANTIBODY SCREEN: NORMAL
BLOOD CULTURE, ROUTINE: NORMAL
BUN BLDV-MCNC: 24 MG/DL (ref 7–20)
CALCIUM SERPL-MCNC: 8.7 MG/DL (ref 8.3–10.6)
CHLORIDE BLD-SCNC: 102 MMOL/L (ref 99–110)
CO2: 28 MMOL/L (ref 21–32)
CREAT SERPL-MCNC: 0.9 MG/DL (ref 0.9–1.3)
CULTURE, BLOOD 2: NORMAL
GFR AFRICAN AMERICAN: >60
GFR NON-AFRICAN AMERICAN: >60
GLUCOSE BLD-MCNC: 148 MG/DL (ref 70–99)
HCT VFR BLD CALC: 41.3 % (ref 40.5–52.5)
HEMOGLOBIN: 14 G/DL (ref 13.5–17.5)
MCH RBC QN AUTO: 31.7 PG (ref 26–34)
MCHC RBC AUTO-ENTMCNC: 33.8 G/DL (ref 31–36)
MCV RBC AUTO: 93.8 FL (ref 80–100)
PDW BLD-RTO: 13.1 % (ref 12.4–15.4)
PLATELET # BLD: 220 K/UL (ref 135–450)
PMV BLD AUTO: 8.2 FL (ref 5–10.5)
POTASSIUM REFLEX MAGNESIUM: 4 MMOL/L (ref 3.5–5.1)
RBC # BLD: 4.41 M/UL (ref 4.2–5.9)
SODIUM BLD-SCNC: 138 MMOL/L (ref 136–145)
WBC # BLD: 13 K/UL (ref 4–11)

## 2020-12-01 PROCEDURE — 86901 BLOOD TYPING SEROLOGIC RH(D): CPT

## 2020-12-01 PROCEDURE — 6360000002 HC RX W HCPCS: Performed by: INTERNAL MEDICINE

## 2020-12-01 PROCEDURE — 80048 BASIC METABOLIC PNL TOTAL CA: CPT

## 2020-12-01 PROCEDURE — 6370000000 HC RX 637 (ALT 250 FOR IP): Performed by: INTERNAL MEDICINE

## 2020-12-01 PROCEDURE — 2580000003 HC RX 258: Performed by: INTERNAL MEDICINE

## 2020-12-01 PROCEDURE — 85027 COMPLETE CBC AUTOMATED: CPT

## 2020-12-01 PROCEDURE — 1200000000 HC SEMI PRIVATE

## 2020-12-01 PROCEDURE — 2700000000 HC OXYGEN THERAPY PER DAY

## 2020-12-01 PROCEDURE — 94761 N-INVAS EAR/PLS OXIMETRY MLT: CPT

## 2020-12-01 PROCEDURE — 2500000003 HC RX 250 WO HCPCS: Performed by: INTERNAL MEDICINE

## 2020-12-01 PROCEDURE — 86850 RBC ANTIBODY SCREEN: CPT

## 2020-12-01 PROCEDURE — XW033E5 INTRODUCTION OF REMDESIVIR ANTI-INFECTIVE INTO PERIPHERAL VEIN, PERCUTANEOUS APPROACH, NEW TECHNOLOGY GROUP 5: ICD-10-PCS | Performed by: INTERNAL MEDICINE

## 2020-12-01 PROCEDURE — XW13325 TRANSFUSION OF CONVALESCENT PLASMA (NONAUTOLOGOUS) INTO PERIPHERAL VEIN, PERCUTANEOUS APPROACH, NEW TECHNOLOGY GROUP 5: ICD-10-PCS | Performed by: INTERNAL MEDICINE

## 2020-12-01 PROCEDURE — 36415 COLL VENOUS BLD VENIPUNCTURE: CPT

## 2020-12-01 PROCEDURE — 86900 BLOOD TYPING SEROLOGIC ABO: CPT

## 2020-12-01 RX ORDER — 0.9 % SODIUM CHLORIDE 0.9 %
20 INTRAVENOUS SOLUTION INTRAVENOUS ONCE
Status: DISCONTINUED | OUTPATIENT
Start: 2020-12-01 | End: 2020-12-06 | Stop reason: HOSPADM

## 2020-12-01 RX ORDER — DEXAMETHASONE SODIUM PHOSPHATE 10 MG/ML
10 INJECTION, SOLUTION INTRAMUSCULAR; INTRAVENOUS EVERY 12 HOURS
Status: DISCONTINUED | OUTPATIENT
Start: 2020-12-01 | End: 2020-12-05

## 2020-12-01 RX ADMIN — Medication 10 ML: at 20:58

## 2020-12-01 RX ADMIN — ENOXAPARIN SODIUM 40 MG: 40 INJECTION SUBCUTANEOUS at 08:00

## 2020-12-01 RX ADMIN — ENOXAPARIN SODIUM 40 MG: 40 INJECTION SUBCUTANEOUS at 20:58

## 2020-12-01 RX ADMIN — Medication 10 ML: at 08:01

## 2020-12-01 RX ADMIN — OXYCODONE HYDROCHLORIDE AND ACETAMINOPHEN 1000 MG: 500 TABLET ORAL at 20:58

## 2020-12-01 RX ADMIN — LISINOPRIL 40 MG: 40 TABLET ORAL at 07:59

## 2020-12-01 RX ADMIN — DEXAMETHASONE 6 MG: 6 TABLET ORAL at 08:00

## 2020-12-01 RX ADMIN — Medication 1000 UNITS: at 07:59

## 2020-12-01 RX ADMIN — REMDESIVIR 100 MG: 5 INJECTION INTRAVENOUS at 15:02

## 2020-12-01 RX ADMIN — OXYCODONE HYDROCHLORIDE AND ACETAMINOPHEN 1000 MG: 500 TABLET ORAL at 08:00

## 2020-12-01 RX ADMIN — DEXAMETHASONE SODIUM PHOSPHATE 10 MG: 10 INJECTION, SOLUTION INTRAMUSCULAR; INTRAVENOUS at 20:59

## 2020-12-01 RX ADMIN — AMLODIPINE BESYLATE 10 MG: 5 TABLET ORAL at 07:59

## 2020-12-01 ASSESSMENT — PAIN SCALES - GENERAL
PAINLEVEL_OUTOF10: 0

## 2020-12-01 NOTE — PLAN OF CARE
Problem: Airway Clearance - Ineffective  Goal: Achieve or maintain patent airway  Outcome: Ongoing     Problem: Gas Exchange - Impaired  Goal: Absence of hypoxia  Outcome: Ongoing  Goal: Promote optimal lung function  Outcome: Ongoing     Problem: Breathing Pattern - Ineffective  Goal: Ability to achieve and maintain a regular respiratory rate  Outcome: Ongoing     Problem:  Body Temperature -  Risk of, Imbalanced  Goal: Ability to maintain a body temperature within defined limits  Outcome: Ongoing  Goal: Will regain or maintain usual level of consciousness  Outcome: Ongoing  Goal: Complications related to the disease process, condition or treatment will be avoided or minimized  Outcome: Ongoing     Problem: Isolation Precautions - Risk of Spread of Infection  Goal: Prevent transmission of infection  Outcome: Ongoing     Problem: Nutrition Deficits  Goal: Optimize nutrtional status  Outcome: Ongoing     Problem: Risk for Fluid Volume Deficit  Goal: Maintain normal heart rhythm  Outcome: Ongoing  Goal: Maintain absence of muscle cramping  Outcome: Ongoing  Goal: Maintain normal serum potassium, sodium, calcium, phosphorus, and pH  Outcome: Ongoing     Problem: Loneliness or Risk for Loneliness  Goal: Demonstrate positive use of time alone when socialization is not possible  Outcome: Ongoing     Problem: Fatigue  Goal: Verbalize increase energy and improved vitality  Outcome: Ongoing     Problem: Patient Education: Go to Patient Education Activity  Goal: Patient/Family Education  Outcome: Ongoing     Problem: Fluid Volume:  Goal: Maintenance of adequate hydration will improve  Description: Maintenance of adequate hydration will improve  Outcome: Ongoing     Problem: Physical Regulation:  Goal: Ability to maintain a body temperature in the normal range will improve  Description: Ability to maintain a body temperature in the normal range will improve  Outcome: Ongoing  Goal: Will regain or maintain usual level of

## 2020-12-01 NOTE — PROGRESS NOTES
Last temp 102.7 orally. Deborah Rangel NP notified via secure message. NP informed of all vital signs. No recommendation for BP. NP recommended cooling blanket for patient and tylenol changed to Q4H. Will continue to monitor.     Electronically signed by Gracie Rosas RN on 12/1/2020 at 12:00 AM

## 2020-12-01 NOTE — ACP (ADVANCE CARE PLANNING)
orders.     Length of ACP Conversation in minutes:  10     Conversation Outcomes:  [x] ACP discussion completed  [] Existing advance directive reviewed with patient; no changes to patient's previously recorded wishes  [] New Advance Directive completed  [] Portable Do Not Rescitate prepared for Provider review and signature  [] POLST/POST/MOLST/MOST prepared for Provider review and signature      Follow-up plan:    [] Schedule follow-up conversation to continue planning  [] Referred individual to Provider for additional questions/concerns   [] Advised patient/agent/surrogate to review completed ACP document and update if needed with changes in condition, patient preferences or care setting    [x] This note routed to one or more involved healthcare providers      Electronically signed by Ainsley Ulrich RN on 12/1/2020 at [address]  PM

## 2020-12-01 NOTE — PROGRESS NOTES
judgement and insight. Data    LABS  CBC:   Recent Labs     11/30/20  1149 12/01/20  0610   WBC 14.3* 13.0*   HGB 14.2 14.0   HCT 42.0 41.3   MCV 94.6 93.8    220     BMP:   Recent Labs     11/30/20  1149 12/01/20  0610   * 138   K 4.0 4.0   CL 97* 102   CO2 27 28   BUN 24* 24*   CREATININE 1.3 0.9   GLUCOSE 137* 148*     POC GLUCOSE:  No results for input(s): POCGLU in the last 72 hours. LIVER PROFILE:   Recent Labs     11/30/20  1149   AST 26   ALT 18   LABALBU 3.6   BILITOT 0.6   ALKPHOS 56     PT/INR: No results for input(s): PROTIME, INR in the last 72 hours. APTT: No results for input(s): APTT in the last 72 hours. UA:No results for input(s): NITRITE, COLORU, PHUR, LABCAST, WBCUA, RBCUA, MUCUS, TRICHOMONAS, YEAST, BACTERIA, CLARITYU, SPECGRAV, LEUKOCYTESUR, UROBILINOGEN, BILIRUBINUR, BLOODU, GLUCOSEU, KETUA, AMORPHOUS in the last 72 hours. Microbiology:  Wound Culture: No results for input(s): WNDABS, ORG in the last 72 hours. Invalid input(s):  LABGRAM  Nasal Culture: No results for input(s): ORG, MRSAPCR in the last 72 hours. Blood Culture: No results for input(s): BC, BLOODCULT2 in the last 72 hours. Fungal Culture:   No results for input(s): FUNGSM in the last 72 hours. No results for input(s): FUNCXBLD in the last 72 hours. CSF Culture:  No results for input(s): COLORCSF, APPEARCSF, CFTUBE, CLOTCSF, WBCCSF, RBCCSF, NEUTCSF, NUMCELLSCSF, LYMPHSCSF, MONOCSF, GLUCCSF, VOLCSF in the last 72 hours. Respiratory Culture:  No results for input(s): Savanah Pore in the last 72 hours. AFB:No results for input(s): AFBSMEAR in the last 72 hours. Urine Culture  No results for input(s): LABURIN in the last 72 hours. RADIOLOGY:    XR CHEST PORTABLE   Final Result   Increasing bilateral airspace disease as compared to prior, compatible with   worsening pneumonia given patient history of COVID-19 infection.              CONSULTS:    IP CONSULT TO PHARMACY    ASSESSMENT AND PLAN: Active Problems:    Acute respiratory failure due to COVID-19 [facility])  Resolved Problems:    * No resolved hospital problems. *    The patient is a 48 y.o. male hypertension who presents to [facility] with worsening fatigue, weakness and dyspnea on exertion. Patient states he has had fevers off and on with some cough. Patient was admitted with acute hypoxic respiratory failure secondary to Covid pneumonia    Acute hypoxic respiratory failure- worse, now needing 10 L  -Secondary to Covid pneumonia  -Wean as tolerated     COVID-19 multifocal pneumonia  -Patient was on a Medrol Dosepak, will continue dexamethasone, switch to high dose  -Start remdesivir day 2  -will give convalescent plasma  -Procalcitonin ok   -Inflammatory markers noted      Sepsis present on admission- sec to covid  -With leukocytosis and tachycardia  -No IV fluids due to chance of ARDS  -Worsening leukocytosis possibly due to the fact the patient was on steroids at home     Hypertension  -Continue home medicine and monitor blood pressure      DVT Prophylaxis: lovenox  Diet: DIET GENERAL;  Code Status: Full Code      Discharge plan - ct care    The patient and / or the family were informed of the results of any tests, a time was given to answer questions, a plan was proposed and they agreed with plan. Discussed with consulting physicians, nursing and social work     The note was completed using EMR. Every effort was made to ensure accuracy; however, inadvertent computerized transcription errors may be present.        Roxy Newby MD

## 2020-12-01 NOTE — ED NOTES
Report called to [facility] RN, all questions answered at this time.       Kiana Lomeli RN  11/30/20 1931

## 2020-12-01 NOTE — PROGRESS NOTES
Patient refused convalescent plasma. Patient stated that he didn't feel like he was sick enough to receive it. This writer educated patient on importance of following medication regimen, patient still refused. MD aware.  Electronically signed by Brian Jain RN on 12/1/2020 at 4:37 PM

## 2020-12-01 NOTE — CARE COORDINATION
INITIAL CASE MANAGEMENT ASSESSMENT    Reviewed chart, unable to meet with patient due to isolation status. Call to patient's room, spoke with patient over the phone to assess possible discharge needs. Explained Case Management role/services. Living Situation: confirmed address - updated in [address] Lives alone in an apartment with 4 steps to enter. Has a 13and 8year old but they do not live with him. ADLs: independent, works at [employer]      DME: none    PT/OT Recs: Not ordered at this time     Active Services: none     Transportation: active , car in ED parking lot     Medications: confirmed [facility], uses [facility] [address], but wants to use [facility] to [facility] this admission if possible    PCP: has a PCP but cannot remember his name, states he had a virtual visit with him 3 months ago      HD/PD: N/A    PLAN/COMMENTS: Plan is to return home. Denies D/C needs at this time. Will monitor for home oxygen or therapy needs at D/C. CM provided contact information for patient or family to call with any questions. CM will follow and assist as needed.     Electronically signed by Ant Ayala RN on 12/1/2020 at 6:06 PM

## 2020-12-01 NOTE — PLAN OF CARE
Problem: Airway Clearance - Ineffective  Goal: Achieve or maintain patent airway  12/1/2020 1020 by Patricia Sky RN  Outcome: Ongoing     Problem: Gas Exchange - Impaired  Goal: Absence of hypoxia  12/1/2020 1020 by Patricia Sky RN  Outcome: Ongoing     Problem: Breathing Pattern - Ineffective  Goal: Ability to achieve and maintain a regular respiratory rate  12/1/2020 1020 by Patricia Sky RN  Outcome: Ongoing     Problem:  Body Temperature -  Risk of, Imbalanced  Goal: Ability to maintain a body temperature within defined limits  12/1/2020 1020 by Patricia Sky RN  Outcome: Ongoing     Problem: Isolation Precautions - Risk of Spread of Infection  Goal: Prevent transmission of infection  12/1/2020 1020 by Patricia Sky RN  Outcome: Ongoing     Problem: Nutrition Deficits  Goal: Optimize nutrtional status  12/1/2020 1020 by Patricia Sky RN  Outcome: Ongoing     Problem: Risk for Fluid Volume Deficit  Goal: Maintain normal heart rhythm  12/1/2020 1020 by Patricia Sky RN  Outcome: Ongoing     Problem: Loneliness or Risk for Loneliness  Goal: Demonstrate positive use of time alone when socialization is not possible  12/1/2020 1020 by Patricia Sky RN  Outcome: Ongoing     Problem: Fatigue  Goal: Verbalize increase energy and improved vitality  12/1/2020 1020 by Patricia Sky RN  Outcome: Ongoing     Problem: Patient Education: Go to Patient Education Activity  Goal: Patient/Family Education  12/1/2020 1020 by Patricia Sky RN  Outcome: Ongoing     Problem: Fluid Volume:  Goal: Maintenance of adequate hydration will improve  Description: Maintenance of adequate hydration will improve  12/1/2020 1020 by Patricia Sky RN  Outcome: Ongoing     Problem: Physical Regulation:  Goal: Ability to maintain a body temperature in the normal range will improve  Description: Ability to maintain a body temperature in the normal range will improve  12/1/2020 1020 by Patricia Sky RN  Outcome: Ongoing     Problem: Falls - Risk of:  Goal: Will remain free from falls  Description: Will remain free from falls  Outcome: Ongoing     Problem: Infection:  Goal: Will remain free from infection  Description: Will remain free from infection  Outcome: Ongoing     Problem: Safety:  Goal: Free from accidental physical injury  Description: Free from accidental physical injury  Outcome: Ongoing     Problem: Daily Care:  Goal: Daily care needs are met  Description: Daily care needs are met  Outcome: Ongoing     Problem: Skin Integrity:  Goal: Skin integrity will stabilize  Description: Skin integrity will stabilize  Outcome: Ongoing     Problem: Discharge Planning:  Goal: Patients continuum of care needs are met  Description: Patients continuum of care needs are met  Outcome: Ongoing

## 2020-12-01 NOTE — FLOWSHEET NOTE
11/30/20 2258   Vital Signs   Temp 100.3 °F (37.9 °C)   Temp Source Oral   Pulse 115   Heart Rate Source Telemetry   Resp 30   BP (!) 146/97   BP Location Left upper arm   BP Upper/Lower Upper   Level of Consciousness 0   MEWS Score 5   Patient Currently in Pain No     Pt vitals as written above. Cooling blanket remains in place on patient. Patient denies needs at this time. Will continue to monitor.      Electronically signed by Asmita Ortiz RN on 12/1/2020 at 12:01 AM

## 2020-12-01 NOTE — PROGRESS NOTES
Patient is alert and oriented x4, up with stand by assist x1, call light within reach, bed/chair alarm on. AM meds complete, patient tolerated well. VSS and WDL. No s/s of distress, no further needs noted at this time.  Electronically signed by Luis Becerra RN on 12/1/2020 at 10:21 AM

## 2020-12-02 LAB
A/G RATIO: 0.8 (ref 1.1–2.2)
ALBUMIN SERPL-MCNC: 3.4 G/DL (ref 3.4–5)
ALP BLD-CCNC: 62 U/L (ref 40–129)
ALT SERPL-CCNC: 21 U/L (ref 10–40)
ANION GAP SERPL CALCULATED.3IONS-SCNC: 11 MMOL/L (ref 3–16)
ANTI-XA LOV PROPHYLAXIS: 0.09 IU/ML (ref 0.3–0.6)
AST SERPL-CCNC: 22 U/L (ref 15–37)
BILIRUB SERPL-MCNC: 0.3 MG/DL (ref 0–1)
BUN BLDV-MCNC: 33 MG/DL (ref 7–20)
CALCIUM SERPL-MCNC: 9.2 MG/DL (ref 8.3–10.6)
CHLORIDE BLD-SCNC: 100 MMOL/L (ref 99–110)
CO2: 26 MMOL/L (ref 21–32)
CREAT SERPL-MCNC: 0.8 MG/DL (ref 0.9–1.3)
D DIMER: 514 NG/ML DDU (ref 0–229)
GFR AFRICAN AMERICAN: >60
GFR NON-AFRICAN AMERICAN: >60
GLOBULIN: 4.4 G/DL
GLUCOSE BLD-MCNC: 254 MG/DL (ref 70–99)
POTASSIUM SERPL-SCNC: 4 MMOL/L (ref 3.5–5.1)
SODIUM BLD-SCNC: 137 MMOL/L (ref 136–145)
TOTAL PROTEIN: 7.8 G/DL (ref 6.4–8.2)

## 2020-12-02 PROCEDURE — 6370000000 HC RX 637 (ALT 250 FOR IP): Performed by: INTERNAL MEDICINE

## 2020-12-02 PROCEDURE — 6360000002 HC RX W HCPCS: Performed by: INTERNAL MEDICINE

## 2020-12-02 PROCEDURE — 80053 COMPREHEN METABOLIC PANEL: CPT

## 2020-12-02 PROCEDURE — 2500000003 HC RX 250 WO HCPCS: Performed by: INTERNAL MEDICINE

## 2020-12-02 PROCEDURE — 85379 FIBRIN DEGRADATION QUANT: CPT

## 2020-12-02 PROCEDURE — 1200000000 HC SEMI PRIVATE

## 2020-12-02 PROCEDURE — 6370000000 HC RX 637 (ALT 250 FOR IP): Performed by: NURSE PRACTITIONER

## 2020-12-02 PROCEDURE — 2700000000 HC OXYGEN THERAPY PER DAY

## 2020-12-02 PROCEDURE — 94761 N-INVAS EAR/PLS OXIMETRY MLT: CPT

## 2020-12-02 PROCEDURE — 36415 COLL VENOUS BLD VENIPUNCTURE: CPT

## 2020-12-02 PROCEDURE — 2580000003 HC RX 258: Performed by: INTERNAL MEDICINE

## 2020-12-02 PROCEDURE — 85520 HEPARIN ASSAY: CPT

## 2020-12-02 RX ADMIN — AMLODIPINE BESYLATE 10 MG: 5 TABLET ORAL at 08:44

## 2020-12-02 RX ADMIN — ENOXAPARIN SODIUM 40 MG: 40 INJECTION SUBCUTANEOUS at 08:44

## 2020-12-02 RX ADMIN — Medication 10 ML: at 22:57

## 2020-12-02 RX ADMIN — ENOXAPARIN SODIUM 40 MG: 40 INJECTION SUBCUTANEOUS at 22:56

## 2020-12-02 RX ADMIN — DEXAMETHASONE SODIUM PHOSPHATE 10 MG: 10 INJECTION, SOLUTION INTRAMUSCULAR; INTRAVENOUS at 22:56

## 2020-12-02 RX ADMIN — REMDESIVIR 100 MG: 5 INJECTION INTRAVENOUS at 14:10

## 2020-12-02 RX ADMIN — LISINOPRIL 40 MG: 40 TABLET ORAL at 08:44

## 2020-12-02 RX ADMIN — OXYCODONE HYDROCHLORIDE AND ACETAMINOPHEN 1000 MG: 500 TABLET ORAL at 22:56

## 2020-12-02 RX ADMIN — GUAIFENESIN AND DEXTROMETHORPHAN 5 ML: 100; 10 SYRUP ORAL at 22:57

## 2020-12-02 RX ADMIN — Medication 1000 UNITS: at 08:44

## 2020-12-02 RX ADMIN — BENZONATATE 100 MG: 100 CAPSULE ORAL at 08:44

## 2020-12-02 RX ADMIN — OXYCODONE HYDROCHLORIDE AND ACETAMINOPHEN 1000 MG: 500 TABLET ORAL at 14:10

## 2020-12-02 RX ADMIN — OXYCODONE HYDROCHLORIDE AND ACETAMINOPHEN 1000 MG: 500 TABLET ORAL at 08:44

## 2020-12-02 RX ADMIN — DEXAMETHASONE SODIUM PHOSPHATE 10 MG: 10 INJECTION, SOLUTION INTRAMUSCULAR; INTRAVENOUS at 08:44

## 2020-12-02 RX ADMIN — ACETAMINOPHEN 650 MG: 325 TABLET ORAL at 08:44

## 2020-12-02 RX ADMIN — Medication 10 ML: at 08:47

## 2020-12-02 RX ADMIN — GUAIFENESIN AND DEXTROMETHORPHAN 5 ML: 100; 10 SYRUP ORAL at 08:44

## 2020-12-02 ASSESSMENT — PAIN - FUNCTIONAL ASSESSMENT: PAIN_FUNCTIONAL_ASSESSMENT: PREVENTS OR INTERFERES SOME ACTIVE ACTIVITIES AND ADLS

## 2020-12-02 ASSESSMENT — PAIN DESCRIPTION - ORIENTATION: ORIENTATION: RIGHT;LEFT;MID

## 2020-12-02 ASSESSMENT — PAIN SCALES - GENERAL
PAINLEVEL_OUTOF10: 0
PAINLEVEL_OUTOF10: 3

## 2020-12-02 ASSESSMENT — PAIN DESCRIPTION - FREQUENCY: FREQUENCY: CONTINUOUS

## 2020-12-02 ASSESSMENT — PAIN DESCRIPTION - PAIN TYPE: TYPE: ACUTE PAIN

## 2020-12-02 ASSESSMENT — PAIN DESCRIPTION - LOCATION: LOCATION: GENERALIZED

## 2020-12-02 ASSESSMENT — PAIN DESCRIPTION - PROGRESSION: CLINICAL_PROGRESSION: NOT CHANGED

## 2020-12-02 ASSESSMENT — PAIN DESCRIPTION - ONSET: ONSET: ON-GOING

## 2020-12-02 ASSESSMENT — PAIN DESCRIPTION - DESCRIPTORS: DESCRIPTORS: ACHING

## 2020-12-02 NOTE — PROGRESS NOTES
Pt agreeable to convalescent plasma; no questions or concerns voiced. Consent signed and placed in patient's paper chart. Call placed to blood bank informing of patient's consent. Since patient refused yesterday, the plasma will have to be reordered from [facility]. BB stated they will call when plasma is ready.          Electronically signed by Lyubov Jaimes RN on 12/2/2020 at 3:06 PM

## 2020-12-02 NOTE — PROGRESS NOTES
Progress Note  Admit Date: 11/30/2020      PCP: No primary care provider on file. CC: F/U for hypoxia    Days in hospital:  2    SUBJECTIVE / Interval History:  Patient states he feels much better. His appetite is coming back. Has a lot of cough   still needing 10 L o2      Allergies  Patient has no known allergies. Medications    Scheduled Meds:   sodium chloride  20 mL Intravenous Once    dexamethasone  10 mg Intravenous Q12H    amLODIPine  10 mg Oral Daily    lisinopril  40 mg Oral Daily    vitamin C  1,000 mg Oral TID    Vitamin D  1,000 Units Oral Daily    sodium chloride flush  10 mL Intravenous 2 times per day    enoxaparin  40 mg Subcutaneous BID    remdesivir IVPB  100 mg Intravenous Q24H     Continuous Infusions:    PRN Meds:  albuterol sulfate HFA, benzonatate, sodium chloride flush, polyethylene glycol, promethazine **OR** ondansetron, potassium chloride **OR** potassium alternative oral replacement **OR** potassium chloride, magnesium sulfate, guaiFENesin-dextromethorphan, sodium chloride, acetaminophen **OR** acetaminophen    Vitals    /83   Pulse 103   Temp 98.2 °F (36.8 °C) (Oral)   Resp 20   Ht 5' 8\" (1.727 m)   Wt 239 lb 10.2 oz (108.7 kg)   SpO2 96%   BMI 36.44 kg/m²     Exam:    Gen: No distress. Eyes: PERRL. No sclera icterus. No conjunctival injection. ENT: No discharge. Pharynx clear. External appearance of ears and nose normal.  Neck: Trachea midline. No obvious mass. Resp: No accessory muscle use. No crackles. No wheezes. Few rhonchi. No dullness on percussion. CV: Regular rate. Regular rhythm. No murmur or rub. No edema. GI: Non-tender. Non-distended. No hernia. Skin: Warm, dry, normal texture and turgor. No nodule on exposed extremities. Lymph: No cervical LAD. No supraclavicular LAD. M/S: No cyanosis. No clubbing. No joint deformity. Neuro: Moves all four extremities. CN 2-12 tested, no defect noted. Psych: Oriented x 3. No anxiety. Awake. Alert. Intact judgement and insight. Data    LABS  CBC:   Recent Labs     11/30/20  1149 12/01/20  0610   WBC 14.3* 13.0*   HGB 14.2 14.0   HCT 42.0 41.3   MCV 94.6 93.8    220     BMP:   Recent Labs     11/30/20  1149 12/01/20  0610   * 138   K 4.0 4.0   CL 97* 102   CO2 27 28   BUN 24* 24*   CREATININE 1.3 0.9   GLUCOSE 137* 148*     POC GLUCOSE:  No results for input(s): POCGLU in the last 72 hours. LIVER PROFILE:   Recent Labs     11/30/20  1149   AST 26   ALT 18   LABALBU 3.6   BILITOT 0.6   ALKPHOS 56     PT/INR: No results for input(s): PROTIME, INR in the last 72 hours. APTT: No results for input(s): APTT in the last 72 hours. UA:No results for input(s): NITRITE, COLORU, PHUR, LABCAST, WBCUA, RBCUA, MUCUS, TRICHOMONAS, YEAST, BACTERIA, CLARITYU, SPECGRAV, LEUKOCYTESUR, UROBILINOGEN, BILIRUBINUR, BLOODU, GLUCOSEU, KETUA, AMORPHOUS in the last 72 hours. Microbiology:  Wound Culture: No results for input(s): WNDABS, ORG in the last 72 hours. Invalid input(s):  LABGRAM  Nasal Culture: No results for input(s): ORG, MRSAPCR in the last 72 hours. Blood Culture: No results for input(s): BC, BLOODCULT2 in the last 72 hours. Fungal Culture:   No results for input(s): FUNGSM in the last 72 hours. No results for input(s): FUNCXBLD in the last 72 hours. CSF Culture:  No results for input(s): COLORCSF, APPEARCSF, CFTUBE, CLOTCSF, WBCCSF, RBCCSF, NEUTCSF, NUMCELLSCSF, LYMPHSCSF, MONOCSF, GLUCCSF, VOLCSF in the last 72 hours. Respiratory Culture:  No results for input(s): Oval Paganini in the last 72 hours. AFB:No results for input(s): AFBSMEAR in the last 72 hours. Urine Culture  No results for input(s): LABURIN in the last 72 hours. RADIOLOGY:    XR CHEST PORTABLE   Final Result   Increasing bilateral airspace disease as compared to prior, compatible with   worsening pneumonia given patient history of COVID-19 infection.              CONSULTS:    IP CONSULT TO PHARMACY    ASSESSMENT AND PLAN:      Active Problems:    Acute respiratory failure due to COVID-19 [facility])  Resolved Problems:    * No resolved hospital problems. *    The patient is a 48 y.o. male hypertension who presents to [facility] with worsening fatigue, weakness and dyspnea on exertion. Patient states he has had fevers off and on with some cough. Patient was admitted with acute hypoxic respiratory failure secondary to Covid pneumonia    Acute hypoxic respiratory failure- worse, now needing 10 L  -Secondary to Covid pneumonia  -Wean as tolerated     COVID-19 multifocal pneumonia  -Patient was on a Medrol Dosepak, will continue dexamethasone, switch to high dose day 2  -Start remdesivir day 3  agreeable to plasma convalescent plasma  -Procalcitonin ok   -Inflammatory markers noted      Sepsis present on admission- sec to covid, improving  -With leukocytosis and tachycardia  -No IV fluids due to chance of ARDS  -Worsening leukocytosis possibly due to the fact the patient was on steroids at home     Hypertension  -Continue home medicine and monitor blood pressure      DVT Prophylaxis: lovenox  Diet: DIET GENERAL;  Code Status: Full Code      Discharge plan - ct care    The patient and / or the family were informed of the results of any tests, a time was given to answer questions, a plan was proposed and they agreed with plan. Discussed with consulting physicians, nursing and social work     The note was completed using EMR. Every effort was made to ensure accuracy; however, inadvertent computerized transcription errors may be present.        Hans Alonso MD

## 2020-12-02 NOTE — PLAN OF CARE
Problem: Airway Clearance - Ineffective  Goal: Achieve or maintain patent airway  Outcome: Met This Shift   Alert and oriented x4 Dyspnic with exertion. Sats. WNL on 10 L O2 per high flow. Lungs diminished. Cough and deep breathing exercises encouraged. No c/o pain Cont. Per urinal Frequently turns and repositions self.  Free from fall/injury

## 2020-12-03 LAB
A/G RATIO: 0.8 (ref 1.1–2.2)
ALBUMIN SERPL-MCNC: 3 G/DL (ref 3.4–5)
ALP BLD-CCNC: 54 U/L (ref 40–129)
ALT SERPL-CCNC: 20 U/L (ref 10–40)
ANION GAP SERPL CALCULATED.3IONS-SCNC: 9 MMOL/L (ref 3–16)
AST SERPL-CCNC: 23 U/L (ref 15–37)
BILIRUB SERPL-MCNC: 0.3 MG/DL (ref 0–1)
BLOOD BANK DISPENSE STATUS: NORMAL
BLOOD BANK PRODUCT CODE: NORMAL
BPU ID: NORMAL
BUN BLDV-MCNC: 31 MG/DL (ref 7–20)
CALCIUM SERPL-MCNC: 8.6 MG/DL (ref 8.3–10.6)
CHLORIDE BLD-SCNC: 105 MMOL/L (ref 99–110)
CO2: 27 MMOL/L (ref 21–32)
CREAT SERPL-MCNC: 0.8 MG/DL (ref 0.9–1.3)
DESCRIPTION BLOOD BANK: NORMAL
GFR AFRICAN AMERICAN: >60
GFR NON-AFRICAN AMERICAN: >60
GLOBULIN: 3.9 G/DL
GLUCOSE BLD-MCNC: 163 MG/DL (ref 70–99)
HCT VFR BLD CALC: 38.6 % (ref 40.5–52.5)
HEMOGLOBIN: 13 G/DL (ref 13.5–17.5)
MCH RBC QN AUTO: 31.9 PG (ref 26–34)
MCHC RBC AUTO-ENTMCNC: 33.6 G/DL (ref 31–36)
MCV RBC AUTO: 95 FL (ref 80–100)
PDW BLD-RTO: 12.9 % (ref 12.4–15.4)
PLATELET # BLD: 305 K/UL (ref 135–450)
PMV BLD AUTO: 7.8 FL (ref 5–10.5)
POTASSIUM SERPL-SCNC: 4.2 MMOL/L (ref 3.5–5.1)
RBC # BLD: 4.07 M/UL (ref 4.2–5.9)
SODIUM BLD-SCNC: 141 MMOL/L (ref 136–145)
TOTAL PROTEIN: 6.9 G/DL (ref 6.4–8.2)
WBC # BLD: 16.4 K/UL (ref 4–11)

## 2020-12-03 PROCEDURE — 1200000000 HC SEMI PRIVATE

## 2020-12-03 PROCEDURE — 85027 COMPLETE CBC AUTOMATED: CPT

## 2020-12-03 PROCEDURE — 6360000002 HC RX W HCPCS: Performed by: INTERNAL MEDICINE

## 2020-12-03 PROCEDURE — 6370000000 HC RX 637 (ALT 250 FOR IP): Performed by: INTERNAL MEDICINE

## 2020-12-03 PROCEDURE — 2500000003 HC RX 250 WO HCPCS: Performed by: INTERNAL MEDICINE

## 2020-12-03 PROCEDURE — 2580000003 HC RX 258: Performed by: INTERNAL MEDICINE

## 2020-12-03 PROCEDURE — 80053 COMPREHEN METABOLIC PANEL: CPT

## 2020-12-03 RX ADMIN — LISINOPRIL 40 MG: 40 TABLET ORAL at 08:30

## 2020-12-03 RX ADMIN — ENOXAPARIN SODIUM 40 MG: 40 INJECTION SUBCUTANEOUS at 20:27

## 2020-12-03 RX ADMIN — AMLODIPINE BESYLATE 10 MG: 5 TABLET ORAL at 08:30

## 2020-12-03 RX ADMIN — OXYCODONE HYDROCHLORIDE AND ACETAMINOPHEN 1000 MG: 500 TABLET ORAL at 13:58

## 2020-12-03 RX ADMIN — Medication 1000 UNITS: at 08:30

## 2020-12-03 RX ADMIN — DEXAMETHASONE SODIUM PHOSPHATE 10 MG: 10 INJECTION, SOLUTION INTRAMUSCULAR; INTRAVENOUS at 08:30

## 2020-12-03 RX ADMIN — DEXAMETHASONE SODIUM PHOSPHATE 10 MG: 10 INJECTION, SOLUTION INTRAMUSCULAR; INTRAVENOUS at 20:27

## 2020-12-03 RX ADMIN — OXYCODONE HYDROCHLORIDE AND ACETAMINOPHEN 1000 MG: 500 TABLET ORAL at 08:30

## 2020-12-03 RX ADMIN — REMDESIVIR 100 MG: 5 INJECTION INTRAVENOUS at 13:58

## 2020-12-03 RX ADMIN — ENOXAPARIN SODIUM 40 MG: 40 INJECTION SUBCUTANEOUS at 08:30

## 2020-12-03 RX ADMIN — OXYCODONE HYDROCHLORIDE AND ACETAMINOPHEN 1000 MG: 500 TABLET ORAL at 20:27

## 2020-12-03 RX ADMIN — Medication 10 ML: at 20:27

## 2020-12-03 ASSESSMENT — PAIN SCALES - GENERAL
PAINLEVEL_OUTOF10: 0

## 2020-12-03 NOTE — PROGRESS NOTES
Progress Note  Admit Date: 11/30/2020      PCP: No primary care provider on file. CC: F/U for hypoxia    Days in hospital:  3    SUBJECTIVE / Interval History:  Patient states he feels much better. His appetite is coming back. still needing 3 L o2      Allergies  Patient has no known allergies. Medications    Scheduled Meds:   sodium chloride  20 mL Intravenous Once    dexamethasone  10 mg Intravenous Q12H    amLODIPine  10 mg Oral Daily    lisinopril  40 mg Oral Daily    vitamin C  1,000 mg Oral TID    Vitamin D  1,000 Units Oral Daily    sodium chloride flush  10 mL Intravenous 2 times per day    enoxaparin  40 mg Subcutaneous BID    remdesivir IVPB  100 mg Intravenous Q24H     Continuous Infusions:    PRN Meds:  albuterol sulfate HFA, benzonatate, sodium chloride flush, polyethylene glycol, promethazine **OR** ondansetron, potassium chloride **OR** potassium alternative oral replacement **OR** potassium chloride, magnesium sulfate, guaiFENesin-dextromethorphan, sodium chloride, acetaminophen **OR** acetaminophen    Vitals    /80   Pulse 88   Temp 98.4 °F (36.9 °C) (Oral)   Resp 18   Ht 5' 8\" (1.727 m)   Wt 238 lb 14.4 oz (108.4 kg)   SpO2 98%   BMI 36.32 kg/m²     Exam:    Gen: No distress. Eyes: PERRL. No sclera icterus. No conjunctival injection. ENT: No discharge. Pharynx clear. External appearance of ears and nose normal.  Neck: Trachea midline. No obvious mass. Resp: No accessory muscle use. No crackles. No wheezes. Few rhonchi. No dullness on percussion. CV: Regular rate. Regular rhythm. No murmur or rub. No edema. GI: Non-tender. Non-distended. No hernia. Skin: Warm, dry, normal texture and turgor. No nodule on exposed extremities. Lymph: No cervical LAD. No supraclavicular LAD. M/S: No cyanosis. No clubbing. No joint deformity. Neuro: Moves all four extremities. CN 2-12 tested, no defect noted. Psych: Oriented x 3. No anxiety. Awake. Alert.  Intact with   worsening pneumonia given patient history of COVID-19 infection. CONSULTS:    IP CONSULT TO PHARMACY    ASSESSMENT AND PLAN:      Active Problems:    Acute respiratory failure due to COVID-19 [facility])  Resolved Problems:    * No resolved hospital problems. *    The patient is a 48 y.o. male hypertension who presents to [facility] with worsening fatigue, weakness and dyspnea on exertion. Patient states he has had fevers off and on with some cough. Patient was admitted with acute hypoxic respiratory failure secondary to Covid pneumonia. Patient was started on remdesivir steroids and convalescent plasma    Acute hypoxic respiratory failure- worse, now needing 3 L  -Secondary to Covid pneumonia  -Wean as tolerated     COVID-19 multifocal pneumonia  -Patient was on a Medrol Dosepak, will continue dexamethasone, switch to high dose day 2  -Start remdesivir day 3  -  convalescent plasma 12/2  -Procalcitonin ok   -Inflammatory markers noted      Sepsis present on admission- sec to covid, improving  -With leukocytosis and tachycardia  -No IV fluids due to chance of ARDS  -Worsening leukocytosis possibly due to the fact the patient was on steroids at home     Hypertension  -Continue home medicine and monitor blood pressure      DVT Prophylaxis: lovenox  Diet: DIET GENERAL;  Code Status: Full Code      Discharge plan - ct care    The patient and / or the family were informed of the results of any tests, a time was given to answer questions, a plan was proposed and they agreed with plan. Discussed with consulting physicians, nursing and social work     The note was completed using EMR. Every effort was made to ensure accuracy; however, inadvertent computerized transcription errors may be present.        Draius Biggs MD

## 2020-12-03 NOTE — PROGRESS NOTES
Patient is alert and oriented x4, UAL, call light within reach,  AM meds complete, patient tolerated well. VSS and WDL. No s/s of distress, no further needs noted at this time.  Electronically signed by Cherelle Valenzuela RN on 12/3/2020 at 10:16 AM

## 2020-12-03 NOTE — PLAN OF CARE
physical injury  12/3/2020 1015 by Kaylynn Sprague RN  Outcome: Ongoing     Problem: Daily Care:  Goal: Daily care needs are met  Description: Daily care needs are met  12/3/2020 1015 by Kaylynn Sprague RN  Outcome: Ongoing     Problem: Discharge Planning:  Goal: Patients continuum of care needs are met  Description: Patients continuum of care needs are met  12/3/2020 1015 by Kaylynn Sprague RN  Outcome: Ongoing     Problem: Skin Integrity:  Goal: Skin integrity will stabilize  Description: Skin integrity will stabilize  12/3/2020 1015 by Kaylynn Sprague RN  Outcome: Ongoing

## 2020-12-03 NOTE — PLAN OF CARE
Problem: Gas Exchange - Impaired  Goal: Absence of hypoxia  Outcome: Ongoing  Goal: Promote optimal lung function  Outcome: Ongoing     Problem: Breathing Pattern - Ineffective  Goal: Ability to achieve and maintain a regular respiratory rate  Outcome: Ongoing     Problem: Isolation Precautions - Risk of Spread of Infection  Goal: Prevent transmission of infection  Outcome: Ongoing     Problem: Loneliness or Risk for Loneliness  Goal: Demonstrate positive use of time alone when socialization is not possible  Outcome: Ongoing     Problem: Fatigue  Goal: Verbalize increase energy and improved vitality  Outcome: Ongoing     Problem: Falls - Risk of:  Goal: Will remain free from falls  Description: Will remain free from falls  Outcome: Ongoing  Goal: Absence of physical injury  Description: Absence of physical injury  Outcome: Ongoing     Problem: Infection:  Goal: Will remain free from infection  Description: Will remain free from infection  Outcome: Ongoing     Problem: Safety:  Goal: Free from accidental physical injury  Description: Free from accidental physical injury  Outcome: Ongoing  Goal: Free from intentional harm  Description: Free from intentional harm  Outcome: Ongoing     Problem: Daily Care:  Goal: Daily care needs are met  Description: Daily care needs are met  Outcome: Ongoing     Problem: Pain:  Goal: Patient's pain/discomfort is manageable  Description: Patient's pain/discomfort is manageable  Outcome: Ongoing     Problem: Skin Integrity:  Goal: Skin integrity will stabilize  Description: Skin integrity will stabilize  Outcome: Ongoing

## 2020-12-04 LAB
A/G RATIO: 0.9 (ref 1.1–2.2)
ALBUMIN SERPL-MCNC: 3.2 G/DL (ref 3.4–5)
ALP BLD-CCNC: 58 U/L (ref 40–129)
ALT SERPL-CCNC: 25 U/L (ref 10–40)
ANION GAP SERPL CALCULATED.3IONS-SCNC: 8 MMOL/L (ref 3–16)
AST SERPL-CCNC: 21 U/L (ref 15–37)
BILIRUB SERPL-MCNC: <0.2 MG/DL (ref 0–1)
BUN BLDV-MCNC: 26 MG/DL (ref 7–20)
CALCIUM SERPL-MCNC: 9.1 MG/DL (ref 8.3–10.6)
CHLORIDE BLD-SCNC: 102 MMOL/L (ref 99–110)
CO2: 27 MMOL/L (ref 21–32)
CREAT SERPL-MCNC: 0.7 MG/DL (ref 0.9–1.3)
GFR AFRICAN AMERICAN: >60
GFR NON-AFRICAN AMERICAN: >60
GLOBULIN: 3.6 G/DL
GLUCOSE BLD-MCNC: 200 MG/DL (ref 70–99)
GLUCOSE BLD-MCNC: 322 MG/DL (ref 70–99)
GLUCOSE BLD-MCNC: 352 MG/DL (ref 70–99)
HCT VFR BLD CALC: 38.7 % (ref 40.5–52.5)
HEMOGLOBIN: 13 G/DL (ref 13.5–17.5)
MCH RBC QN AUTO: 32 PG (ref 26–34)
MCHC RBC AUTO-ENTMCNC: 33.7 G/DL (ref 31–36)
MCV RBC AUTO: 95.2 FL (ref 80–100)
PDW BLD-RTO: 13.5 % (ref 12.4–15.4)
PERFORMED ON: ABNORMAL
PERFORMED ON: ABNORMAL
PLATELET # BLD: 322 K/UL (ref 135–450)
PMV BLD AUTO: 7.5 FL (ref 5–10.5)
POTASSIUM SERPL-SCNC: 4.5 MMOL/L (ref 3.5–5.1)
RBC # BLD: 4.07 M/UL (ref 4.2–5.9)
SODIUM BLD-SCNC: 137 MMOL/L (ref 136–145)
TOTAL PROTEIN: 6.8 G/DL (ref 6.4–8.2)
WBC # BLD: 13.9 K/UL (ref 4–11)

## 2020-12-04 PROCEDURE — 6370000000 HC RX 637 (ALT 250 FOR IP): Performed by: INTERNAL MEDICINE

## 2020-12-04 PROCEDURE — 83036 HEMOGLOBIN GLYCOSYLATED A1C: CPT

## 2020-12-04 PROCEDURE — 80053 COMPREHEN METABOLIC PANEL: CPT

## 2020-12-04 PROCEDURE — 94761 N-INVAS EAR/PLS OXIMETRY MLT: CPT

## 2020-12-04 PROCEDURE — 1200000000 HC SEMI PRIVATE

## 2020-12-04 PROCEDURE — 2580000003 HC RX 258: Performed by: INTERNAL MEDICINE

## 2020-12-04 PROCEDURE — 85027 COMPLETE CBC AUTOMATED: CPT

## 2020-12-04 PROCEDURE — 2500000003 HC RX 250 WO HCPCS: Performed by: INTERNAL MEDICINE

## 2020-12-04 PROCEDURE — 2700000000 HC OXYGEN THERAPY PER DAY

## 2020-12-04 PROCEDURE — 6360000002 HC RX W HCPCS: Performed by: INTERNAL MEDICINE

## 2020-12-04 RX ORDER — DEXTROSE MONOHYDRATE 25 G/50ML
12.5 INJECTION, SOLUTION INTRAVENOUS PRN
Status: DISCONTINUED | OUTPATIENT
Start: 2020-12-04 | End: 2020-12-06 | Stop reason: HOSPADM

## 2020-12-04 RX ORDER — DEXTROSE MONOHYDRATE 50 MG/ML
100 INJECTION, SOLUTION INTRAVENOUS PRN
Status: DISCONTINUED | OUTPATIENT
Start: 2020-12-04 | End: 2020-12-06 | Stop reason: HOSPADM

## 2020-12-04 RX ORDER — NICOTINE POLACRILEX 4 MG
15 LOZENGE BUCCAL PRN
Status: DISCONTINUED | OUTPATIENT
Start: 2020-12-04 | End: 2020-12-06 | Stop reason: HOSPADM

## 2020-12-04 RX ADMIN — AMLODIPINE BESYLATE 10 MG: 5 TABLET ORAL at 08:12

## 2020-12-04 RX ADMIN — REMDESIVIR 100 MG: 5 INJECTION INTRAVENOUS at 14:13

## 2020-12-04 RX ADMIN — INSULIN LISPRO 4 UNITS: 100 INJECTION, SOLUTION INTRAVENOUS; SUBCUTANEOUS at 17:57

## 2020-12-04 RX ADMIN — DEXAMETHASONE SODIUM PHOSPHATE 10 MG: 10 INJECTION, SOLUTION INTRAMUSCULAR; INTRAVENOUS at 08:12

## 2020-12-04 RX ADMIN — DEXAMETHASONE SODIUM PHOSPHATE 10 MG: 10 INJECTION, SOLUTION INTRAMUSCULAR; INTRAVENOUS at 20:44

## 2020-12-04 RX ADMIN — OXYCODONE HYDROCHLORIDE AND ACETAMINOPHEN 1000 MG: 500 TABLET ORAL at 08:13

## 2020-12-04 RX ADMIN — Medication 10 ML: at 20:44

## 2020-12-04 RX ADMIN — LISINOPRIL 40 MG: 40 TABLET ORAL at 08:13

## 2020-12-04 RX ADMIN — Medication 1000 UNITS: at 08:13

## 2020-12-04 RX ADMIN — Medication 10 ML: at 08:13

## 2020-12-04 RX ADMIN — ENOXAPARIN SODIUM 40 MG: 40 INJECTION SUBCUTANEOUS at 20:44

## 2020-12-04 RX ADMIN — INSULIN LISPRO 3 UNITS: 100 INJECTION, SOLUTION INTRAVENOUS; SUBCUTANEOUS at 20:44

## 2020-12-04 RX ADMIN — ENOXAPARIN SODIUM 40 MG: 40 INJECTION SUBCUTANEOUS at 08:12

## 2020-12-04 RX ADMIN — OXYCODONE HYDROCHLORIDE AND ACETAMINOPHEN 1000 MG: 500 TABLET ORAL at 20:44

## 2020-12-04 RX ADMIN — OXYCODONE HYDROCHLORIDE AND ACETAMINOPHEN 1000 MG: 500 TABLET ORAL at 14:13

## 2020-12-04 ASSESSMENT — PAIN SCALES - GENERAL
PAINLEVEL_OUTOF10: 0

## 2020-12-04 NOTE — PROGRESS NOTES
noted.  Psych: Oriented x 3. No anxiety. Awake. Alert. Intact judgement and insight. Data    LABS  CBC:   Recent Labs     12/03/20  0619 12/04/20  0508   WBC 16.4* 13.9*   HGB 13.0* 13.0*   HCT 38.6* 38.7*   MCV 95.0 95.2    322     BMP:   Recent Labs     12/02/20  0947 12/03/20  0619 12/04/20  0508    141 137   K 4.0 4.2 4.5    105 102   CO2 26 27 27   BUN 33* 31* 26*   CREATININE 0.8* 0.8* 0.7*   GLUCOSE 254* 163* 200*     POC GLUCOSE:  No results for input(s): POCGLU in the last 72 hours. LIVER PROFILE:   Recent Labs     12/02/20  0947 12/03/20  0619 12/04/20  0508   AST 22 23 21   ALT 21 20 25   LABALBU 3.4 3.0* 3.2*   BILITOT 0.3 0.3 <0.2   ALKPHOS 62 54 58     PT/INR: No results for input(s): PROTIME, INR in the last 72 hours. APTT: No results for input(s): APTT in the last 72 hours. UA:No results for input(s): NITRITE, COLORU, PHUR, LABCAST, WBCUA, RBCUA, MUCUS, TRICHOMONAS, YEAST, BACTERIA, CLARITYU, SPECGRAV, LEUKOCYTESUR, UROBILINOGEN, BILIRUBINUR, BLOODU, GLUCOSEU, KETUA, AMORPHOUS in the last 72 hours. Microbiology:  Wound Culture: No results for input(s): WNDABS, ORG in the last 72 hours. Invalid input(s):  LABGRAM  Nasal Culture: No results for input(s): ORG, MRSAPCR in the last 72 hours. Blood Culture: No results for input(s): BC, BLOODCULT2 in the last 72 hours. Fungal Culture:   No results for input(s): FUNGSM in the last 72 hours. No results for input(s): FUNCXBLD in the last 72 hours. CSF Culture:  No results for input(s): COLORCSF, APPEARCSF, CFTUBE, CLOTCSF, WBCCSF, RBCCSF, NEUTCSF, NUMCELLSCSF, LYMPHSCSF, MONOCSF, GLUCCSF, VOLCSF in the last 72 hours. Respiratory Culture:  No results for input(s): Alesia Moctezumaf in the last 72 hours. AFB:No results for input(s): AFBSMEAR in the last 72 hours. Urine Culture  No results for input(s): LABURIN in the last 72 hours.     RADIOLOGY:    XR CHEST PORTABLE   Final Result   Increasing bilateral airspace disease as compared to prior, compatible with   worsening pneumonia given patient history of COVID-19 infection. CONSULTS:    IP CONSULT TO PHARMACY    ASSESSMENT AND PLAN:      Active Problems:    Acute respiratory failure due to COVID-19 [facility])  Resolved Problems:    * No resolved hospital problems. *    The patient is a 48 y.o. male hypertension who presents to [facility] with worsening fatigue, weakness and dyspnea on exertion. Patient states he has had fevers off and on with some cough. Patient was admitted with acute hypoxic respiratory failure secondary to Covid pneumonia. Patient was started on remdesivir steroids and convalescent plasma    Acute hypoxic respiratory failure- worse, now needing 7 L, unclear why it was increased overnight. We will try to wean down  -Secondary to Covid pneumonia  -Wean as tolerated     COVID-19 multifocal pneumonia  -Patient was on a Medrol Dosepak, will continue dexamethasone, switch to high dose day 3  -Start remdesivir day 4  -  convalescent plasma 12/2  -Procalcitonin ok   -Inflammatory markers noted     Hyperglycemia-most likely secondary to steroids  -Add sliding scale insulin  -A1c ordered     Sepsis present on admission- sec to covid, improving  -With leukocytosis and tachycardia  -No IV fluids due to chance of ARDS  -Worsening leukocytosis possibly due to the fact the patient was on steroids at home     Hypertension  -Continue home medicine and monitor blood pressure      DVT Prophylaxis: lovenox  Diet: DIET GENERAL;  Code Status: Full Code      Discharge plan -o2 requirement needs to come down before discharge    The patient and / or the family were informed of the results of any tests, a time was given to answer questions, a plan was proposed and they agreed with plan. Discussed with consulting physicians, nursing and social work     The note was completed using EMR.  Every effort was made to ensure accuracy; however, inadvertent computerized transcription errors may be present.        Birdie Andrews MD

## 2020-12-04 NOTE — CARE COORDINATION
Patient remains on 7 L O2, not ready for D/C. Will continue to monitor for needs.     Katey Giron RN, BSN, Case Management  Phone: [phone]  Electronically signed by Katey Giron RN on 12/4/2020 at 2:59 PM

## 2020-12-04 NOTE — PROGRESS NOTES
Pt is resting in bed comfortably in bed. Pt denies pain at this time. PM medications complete, Shift assessment completed. Pt appears to be have no distress or discomfort. Vital signs stable. Call light in reach. Will continue to monitor.

## 2020-12-05 PROBLEM — J96.01 ACUTE RESPIRATORY FAILURE WITH HYPOXIA (HCC): Status: ACTIVE | Noted: 2020-11-30

## 2020-12-05 PROBLEM — A41.9 SEPSIS (HCC): Status: ACTIVE | Noted: 2020-12-05

## 2020-12-05 PROBLEM — U07.1 PNEUMONIA DUE TO COVID-19 VIRUS: Status: ACTIVE | Noted: 2020-12-05

## 2020-12-05 PROBLEM — J12.82 PNEUMONIA DUE TO COVID-19 VIRUS: Status: ACTIVE | Noted: 2020-12-05

## 2020-12-05 PROBLEM — I10 HTN (HYPERTENSION): Status: ACTIVE | Noted: 2020-12-05

## 2020-12-05 LAB
ESTIMATED AVERAGE GLUCOSE: 137 MG/DL
GLUCOSE BLD-MCNC: 180 MG/DL (ref 70–99)
GLUCOSE BLD-MCNC: 261 MG/DL (ref 70–99)
GLUCOSE BLD-MCNC: 311 MG/DL (ref 70–99)
GLUCOSE BLD-MCNC: 367 MG/DL (ref 70–99)
HBA1C MFR BLD: 6.4 %
HCT VFR BLD CALC: 41 % (ref 40.5–52.5)
HEMOGLOBIN: 13.7 G/DL (ref 13.5–17.5)
MCH RBC QN AUTO: 31.8 PG (ref 26–34)
MCHC RBC AUTO-ENTMCNC: 33.5 G/DL (ref 31–36)
MCV RBC AUTO: 95 FL (ref 80–100)
PDW BLD-RTO: 13.3 % (ref 12.4–15.4)
PERFORMED ON: ABNORMAL
PLATELET # BLD: 361 K/UL (ref 135–450)
PMV BLD AUTO: 7.7 FL (ref 5–10.5)
RBC # BLD: 4.32 M/UL (ref 4.2–5.9)
WBC # BLD: 14.9 K/UL (ref 4–11)

## 2020-12-05 PROCEDURE — 6370000000 HC RX 637 (ALT 250 FOR IP): Performed by: INTERNAL MEDICINE

## 2020-12-05 PROCEDURE — 2700000000 HC OXYGEN THERAPY PER DAY

## 2020-12-05 PROCEDURE — 94761 N-INVAS EAR/PLS OXIMETRY MLT: CPT

## 2020-12-05 PROCEDURE — 6360000002 HC RX W HCPCS: Performed by: INTERNAL MEDICINE

## 2020-12-05 PROCEDURE — 85027 COMPLETE CBC AUTOMATED: CPT

## 2020-12-05 PROCEDURE — 1200000000 HC SEMI PRIVATE

## 2020-12-05 PROCEDURE — 2580000003 HC RX 258: Performed by: INTERNAL MEDICINE

## 2020-12-05 PROCEDURE — 36415 COLL VENOUS BLD VENIPUNCTURE: CPT

## 2020-12-05 RX ORDER — DEXAMETHASONE SODIUM PHOSPHATE 10 MG/ML
10 INJECTION, SOLUTION INTRAMUSCULAR; INTRAVENOUS EVERY 24 HOURS
Status: DISCONTINUED | OUTPATIENT
Start: 2020-12-06 | End: 2020-12-06

## 2020-12-05 RX ADMIN — LISINOPRIL 40 MG: 40 TABLET ORAL at 09:06

## 2020-12-05 RX ADMIN — INSULIN LISPRO 4 UNITS: 100 INJECTION, SOLUTION INTRAVENOUS; SUBCUTANEOUS at 17:24

## 2020-12-05 RX ADMIN — ENOXAPARIN SODIUM 40 MG: 40 INJECTION SUBCUTANEOUS at 09:06

## 2020-12-05 RX ADMIN — OXYCODONE HYDROCHLORIDE AND ACETAMINOPHEN 1000 MG: 500 TABLET ORAL at 17:24

## 2020-12-05 RX ADMIN — INSULIN LISPRO 3 UNITS: 100 INJECTION, SOLUTION INTRAVENOUS; SUBCUTANEOUS at 12:26

## 2020-12-05 RX ADMIN — ENOXAPARIN SODIUM 40 MG: 40 INJECTION SUBCUTANEOUS at 21:03

## 2020-12-05 RX ADMIN — INSULIN LISPRO 1 UNITS: 100 INJECTION, SOLUTION INTRAVENOUS; SUBCUTANEOUS at 08:46

## 2020-12-05 RX ADMIN — INSULIN LISPRO 3 UNITS: 100 INJECTION, SOLUTION INTRAVENOUS; SUBCUTANEOUS at 21:04

## 2020-12-05 RX ADMIN — AMLODIPINE BESYLATE 10 MG: 5 TABLET ORAL at 09:07

## 2020-12-05 RX ADMIN — DEXAMETHASONE SODIUM PHOSPHATE 10 MG: 10 INJECTION, SOLUTION INTRAMUSCULAR; INTRAVENOUS at 09:07

## 2020-12-05 RX ADMIN — Medication 10 ML: at 21:03

## 2020-12-05 RX ADMIN — Medication 1000 UNITS: at 09:07

## 2020-12-05 RX ADMIN — Medication 10 ML: at 09:08

## 2020-12-05 RX ADMIN — OXYCODONE HYDROCHLORIDE AND ACETAMINOPHEN 1000 MG: 500 TABLET ORAL at 21:03

## 2020-12-05 RX ADMIN — OXYCODONE HYDROCHLORIDE AND ACETAMINOPHEN 1000 MG: 500 TABLET ORAL at 09:06

## 2020-12-05 ASSESSMENT — PAIN SCALES - GENERAL
PAINLEVEL_OUTOF10: 0

## 2020-12-05 NOTE — PROGRESS NOTES
Hospitalist Progress Note    CC: Pneumonia due to COVID-19 virus    Hospital course:  52yo M with PMHX sig for hypertension who presents to [facility] with worsening fatigue, weakness and dyspnea on exertion.  Patient states he has had fevers off and on with some cough. Patient was admitted with acute hypoxic respiratory failure secondary to Covid pneumonia. Patient was started on remdesivir steroids and convalescent plasma. Does have significant hyperglycemia still. PT did have sepsis POA based on leukocytosis, elevated procalcitonin, tachypnea, tachycardia, acute resp failure with hypoxia requiring oxygen, fever, bilateral infiltrates and positive COVID pneumonia. Admit date: 11/30/2020  Days in hospital:  5    24 Hour Events: feels better, down to 5L oxygen    Subjective: continue to wean oxygen as tolerated - pt concerned about his job and wanted me to speak with his employer - I told him that I would fill out [facility] paperwork to return to his employer    ROS:   A comprehensive review of systems was negative except for: weak, dyspnea on exertion . Objective:    BP (!) 145/96   Pulse 76   Temp 98.1 °F (36.7 °C) (Oral)   Resp 18   Ht 5' 8\" (1.727 m)   Wt 241 lb 13.5 oz (109.7 kg)   SpO2 96%   BMI 36.77 kg/m²     Gen: ill appearing  HEENT: NC/AT, moist mucous membranes, no oropharyngeal erythema or exudate  Neck: supple, trachea midline, no anterior cervical or SC LAD  Heart:  Normal s1/s2, RRR, no murmurs, gallops, or rubs. no leg edema  Lungs:  diminished bilaterally, no wheeze, no rales, no rhonchi, no crackles, no use of accessory muscles  Abd: bowel sounds present, soft, nontender, nondistended, no masses  Extrem:  No clubbing, cyanosis,  no edema  Skin: no rashes or lesions  Psych: A & O x3  Neuro: grossly intact, moves all four extremities.       Assessment:    Principal Problem:    Pneumonia due to COVID-19 virus  Active Problems: Acute respiratory failure due to COVID-19 ([facility] [address]    Sepsis ([facility] [address]    HTN (hypertension)  Resolved Problems:    * No resolved hospital problems. *      Plan:  1. COVID19 pneumonia - has completed remdesivir and did receive plasma - still on decadron - resp failure improving  2. Acute resp failure with hypoxia - due to COVID19 infection -  wean oxygen as tolerated - will get oxygen screening tomorrow for possible discharge  3. Sepsis POA based on leukocytosis, elevated procalcitonin, tachypnea, tachycardia, acute resp failure with hypoxia requiring oxygen, fever, bilateral infiltrates and positive COVID pneumonia. 4.   HTN - continue with lisinopril and norvasc - controlled      Prognosis:  Good    Code status:  Full code    DVT prophylaxis: Lovenox  GI prophylaxis: H2 blocker  Antibiotic prophylaxis indicated:   no  Diet:  DIET GENERAL;    Disposition:  Home    Medications:  Scheduled Meds:   [START ON 12/6/2020] dexamethasone  10 mg Intravenous Q24H    insulin lispro  0-6 Units Subcutaneous TID WC    insulin lispro  0-3 Units Subcutaneous Nightly    sodium chloride  20 mL Intravenous Once    amLODIPine  10 mg Oral Daily    lisinopril  40 mg Oral Daily    vitamin C  1,000 mg Oral TID    Vitamin D  1,000 Units Oral Daily    sodium chloride flush  10 mL Intravenous 2 times per day    enoxaparin  40 mg Subcutaneous BID       PRN Meds:  glucose, dextrose, glucagon (rDNA), dextrose, albuterol sulfate HFA, benzonatate, sodium chloride flush, polyethylene glycol, promethazine **OR** ondansetron, potassium chloride **OR** potassium alternative oral replacement **OR** potassium chloride, magnesium sulfate, guaiFENesin-dextromethorphan, sodium chloride, acetaminophen **OR** acetaminophen    IV:   dextrose           Intake/Output Summary (Last 24 hours) at 12/5/2020 1140  Last data filed at 12/5/2020 0927  Gross per 24 hour   Intake 840 ml   Output 900 ml   Net -60 ml       Results:  CBC:   Recent Labs 12/03/20 0619 12/04/20  0508 12/05/20  0652   WBC 16.4* 13.9* 14.9*   HGB 13.0* 13.0* 13.7   HCT 38.6* 38.7* 41.0   MCV 95.0 95.2 95.0    322 361     BMP:   Recent Labs     12/03/20 0619 12/04/20  0508    137   K 4.2 4.5    102   CO2 27 27   BUN 31* 26*   CREATININE 0.8* 0.7*     Mag: No results for input(s): MAG in the last 72 hours. Phos: No results found for: PHOS  No components found for: GLU    LIVER PROFILE:   Recent Labs     12/03/20 0619 12/04/20  0508   AST 23 21   ALT 20 25   BILITOT 0.3 <0.2   ALKPHOS 54 58     PT/INR: No results for input(s): PROTIME, INR in the last 72 hours. APTT: No results for input(s): APTT in the last 72 hours. UA:No results for input(s): NITRITE, COLORU, PHUR, LABCAST, WBCUA, RBCUA, MUCUS, TRICHOMONAS, YEAST, BACTERIA, CLARITYU, SPECGRAV, LEUKOCYTESUR, UROBILINOGEN, BILIRUBINUR, BLOODU, GLUCOSEU, AMORPHOUS in the last 72 hours.     Invalid input(s): [facility] input(s): ABG  Lab Results   Component Value Date    CALCIUM 9.1 12/04/2020                 Electronically signed by Chavez Amato MD on 12/5/2020 at 11:40 AM

## 2020-12-05 NOTE — PLAN OF CARE
Problem: Airway Clearance - Ineffective  Goal: Achieve or maintain patent airway  12/5/2020 0955 by Burgess Alaina RN  Outcome: Ongoing     Problem: Gas Exchange - Impaired  Goal: Absence of hypoxia  12/5/2020 0955 by Burgess Alaina RN  Outcome: Ongoing     Problem: Gas Exchange - Impaired  Goal: Promote optimal lung function  12/5/2020 0955 by Burgess Alaina RN  Outcome: Ongoing     Problem: Breathing Pattern - Ineffective  Goal: Ability to achieve and maintain a regular respiratory rate  12/5/2020 0955 by Burgess Alaina RN  Outcome: Ongoing     Problem: Body Temperature -  Risk of, Imbalanced  Goal: Ability to maintain a body temperature within defined limits  12/5/2020 0955 by Burgess Alaina RN  Outcome: Ongoing     Problem: Body Temperature -  Risk of, Imbalanced  Goal: Will regain or maintain usual level of consciousness  12/5/2020 0955 by Burgess Alaina RN  Outcome: Ongoing     Problem:  Body Temperature -  Risk of, Imbalanced  Goal: Complications related to the disease process, condition or treatment will be avoided or minimized  12/5/2020 0955 by Burgess Alaina RN  Outcome: Ongoing     Problem: Isolation Precautions - Risk of Spread of Infection  Goal: Prevent transmission of infection  12/5/2020 0955 by Burgess Alaina RN  Outcome: Ongoing     Problem: Nutrition Deficits  Goal: Optimize nutrtional status  12/5/2020 0955 by Burgess Alaina RN  Outcome: Ongoing     Problem: Risk for Fluid Volume Deficit  Goal: Maintain normal heart rhythm  12/5/2020 0955 by Burgess Alaina RN  Outcome: Ongoing     Problem: Risk for Fluid Volume Deficit  Goal: Maintain absence of muscle cramping  12/5/2020 0955 by Burgess Alaina RN  Outcome: Ongoing     Problem: Risk for Fluid Volume Deficit  Goal: Maintain normal serum potassium, sodium, calcium, phosphorus, and pH  12/5/2020 0955 by Burgess Alaina RN  Outcome: Ongoing     Problem: Loneliness or Risk for Loneliness  Goal: accidental physical injury  Description: Free from accidental physical injury  12/5/2020 0955 by Dinorah Langston RN  Outcome: Ongoing     Problem: Safety:  Goal: Free from intentional harm  Description: Free from intentional harm  12/5/2020 0955 by Dinorah Langston RN  Outcome: Ongoing     Problem: Daily Care:  Goal: Daily care needs are met  Description: Daily care needs are met  12/5/2020 0955 by Dinorah Langston RN  Outcome: Ongoing     Problem: Pain:  Goal: Patient's pain/discomfort is manageable  Description: Patient's pain/discomfort is manageable  12/5/2020 0955 by Dinorah Langston RN  Outcome: Ongoing     Problem: Skin Integrity:  Goal: Skin integrity will stabilize  Description: Skin integrity will stabilize  12/5/2020 0955 by Dinorah Langston RN  Outcome: Ongoing     Problem: Discharge Planning:  Goal: Patients continuum of care needs are met  Description: Patients continuum of care needs are met  12/5/2020 0955 by Dinorah Langston RN  Outcome: Ongoing

## 2020-12-06 VITALS
RESPIRATION RATE: 16 BRPM | BODY MASS INDEX: 37.09 KG/M2 | OXYGEN SATURATION: 93 % | HEIGHT: 68 IN | TEMPERATURE: 98.7 F | HEART RATE: 74 BPM | SYSTOLIC BLOOD PRESSURE: 143 MMHG | WEIGHT: 244.71 LBS | DIASTOLIC BLOOD PRESSURE: 78 MMHG

## 2020-12-06 LAB
GLUCOSE BLD-MCNC: 206 MG/DL (ref 70–99)
GLUCOSE BLD-MCNC: 313 MG/DL (ref 70–99)
GLUCOSE BLD-MCNC: 322 MG/DL (ref 70–99)
HCT VFR BLD CALC: 39.4 % (ref 40.5–52.5)
HEMOGLOBIN: 13.3 G/DL (ref 13.5–17.5)
MCH RBC QN AUTO: 31.9 PG (ref 26–34)
MCHC RBC AUTO-ENTMCNC: 33.7 G/DL (ref 31–36)
MCV RBC AUTO: 94.8 FL (ref 80–100)
PDW BLD-RTO: 13.5 % (ref 12.4–15.4)
PERFORMED ON: ABNORMAL
PLATELET # BLD: 351 K/UL (ref 135–450)
PMV BLD AUTO: 7.6 FL (ref 5–10.5)
RBC # BLD: 4.16 M/UL (ref 4.2–5.9)
WBC # BLD: 16.3 K/UL (ref 4–11)

## 2020-12-06 PROCEDURE — 36415 COLL VENOUS BLD VENIPUNCTURE: CPT

## 2020-12-06 PROCEDURE — 6360000002 HC RX W HCPCS: Performed by: INTERNAL MEDICINE

## 2020-12-06 PROCEDURE — 94761 N-INVAS EAR/PLS OXIMETRY MLT: CPT

## 2020-12-06 PROCEDURE — 85027 COMPLETE CBC AUTOMATED: CPT

## 2020-12-06 PROCEDURE — 2700000000 HC OXYGEN THERAPY PER DAY

## 2020-12-06 PROCEDURE — 6370000000 HC RX 637 (ALT 250 FOR IP): Performed by: INTERNAL MEDICINE

## 2020-12-06 PROCEDURE — 2580000003 HC RX 258: Performed by: INTERNAL MEDICINE

## 2020-12-06 RX ORDER — DEXAMETHASONE 6 MG/1
6 TABLET ORAL
Qty: 5 TABLET | Refills: 0 | Status: SHIPPED | OUTPATIENT
Start: 2020-12-06 | End: 2020-12-11

## 2020-12-06 RX ORDER — DEXAMETHASONE 6 MG/1
6 TABLET ORAL DAILY
Status: DISCONTINUED | OUTPATIENT
Start: 2020-12-07 | End: 2020-12-06 | Stop reason: HOSPADM

## 2020-12-06 RX ORDER — FAMOTIDINE 20 MG/1
20 TABLET, FILM COATED ORAL 2 TIMES DAILY
Qty: 60 TABLET | Refills: 0 | Status: SHIPPED | OUTPATIENT
Start: 2020-12-06

## 2020-12-06 RX ORDER — ZINC SULFATE 50(220)MG
50 CAPSULE ORAL DAILY
Qty: 30 CAPSULE | Refills: 0 | COMMUNITY
Start: 2020-12-06 | End: 2021-01-05

## 2020-12-06 RX ORDER — CHOLECALCIFEROL (VITAMIN D3) 25 MCG
1000 TABLET ORAL DAILY
Qty: 30 TABLET | Refills: 0 | COMMUNITY
Start: 2020-12-07 | End: 2021-01-06

## 2020-12-06 RX ORDER — THIAMINE MONONITRATE (VIT B1) 100 MG
100 TABLET ORAL DAILY
Qty: 30 TABLET | Refills: 0 | COMMUNITY
Start: 2020-12-06

## 2020-12-06 RX ADMIN — AMLODIPINE BESYLATE 10 MG: 5 TABLET ORAL at 08:13

## 2020-12-06 RX ADMIN — DEXAMETHASONE SODIUM PHOSPHATE 10 MG: 10 INJECTION, SOLUTION INTRAMUSCULAR; INTRAVENOUS at 08:13

## 2020-12-06 RX ADMIN — INSULIN LISPRO 2 UNITS: 100 INJECTION, SOLUTION INTRAVENOUS; SUBCUTANEOUS at 09:47

## 2020-12-06 RX ADMIN — LISINOPRIL 40 MG: 40 TABLET ORAL at 08:13

## 2020-12-06 RX ADMIN — INSULIN LISPRO 4 UNITS: 100 INJECTION, SOLUTION INTRAVENOUS; SUBCUTANEOUS at 12:35

## 2020-12-06 RX ADMIN — OXYCODONE HYDROCHLORIDE AND ACETAMINOPHEN 1000 MG: 500 TABLET ORAL at 13:54

## 2020-12-06 RX ADMIN — ENOXAPARIN SODIUM 40 MG: 40 INJECTION SUBCUTANEOUS at 08:13

## 2020-12-06 RX ADMIN — OXYCODONE HYDROCHLORIDE AND ACETAMINOPHEN 1000 MG: 500 TABLET ORAL at 08:13

## 2020-12-06 RX ADMIN — Medication 10 ML: at 08:14

## 2020-12-06 RX ADMIN — INSULIN LISPRO 4 UNITS: 100 INJECTION, SOLUTION INTRAVENOUS; SUBCUTANEOUS at 17:34

## 2020-12-06 RX ADMIN — Medication 1000 UNITS: at 08:13

## 2020-12-06 ASSESSMENT — PAIN SCALES - GENERAL: PAINLEVEL_OUTOF10: 0

## 2020-12-06 NOTE — CARE COORDINATION
DISCHARGE SUMMARY     DATE OF DISCHARGE: 12/6/2020    DISCHARGE DESTINATION: Home with family    FACILITY  None. MD would like patient to go home with a home care nurse but can't because he does not have a PCP. Patient stated that he's only had an appointment with urgent care. TRANSPORTATION: Patient will be transporting himself. His car is parked at the ER lobby. COMMENTS: SW to assist patient with obtaining a PCP appointment. Prefers [facility] but they are closed today. SW will follow up and obtain an appointment after 11am within 30 days. SW will call patient in the AM regarding this. Respectfully submitted,    SUSANNE Astudillo  [facility]   [phone]    Electronically signed by SUSANNE Betancourt on 12/6/2020 at 12:37 PM

## 2020-12-06 NOTE — CARE COORDINATION
SW informed that patient is refusing to leave due to not wanting an evening discharge. His car is parked at the ER [facility]. Patient did not indicate that he only drives in the daytime due to issues with vision. Patient normally drives to and from medical appointments without assistance. SW informed that because patient is not a medicare recipient he does not have the right to refuse a medical discharge to home. SW asked nurse to message MD to let him know and inform nursing supervisor as we may have to get security involved. Patient cannot go home with home care orders because he does not have a PCP assigned. SW will work on finding a PCP on Monday morning, the offices are closed. If we are able to obtain a PCP within 7 days we will send the order for nursing care to [facility] to follow up. No further needs. Respectfully submitted,    SUSANNE Mullen  [facility]   [phone]    Electronically signed by SUSANNE Sky on 12/6/2020 at 5:12 PM

## 2020-12-06 NOTE — PROGRESS NOTES
This writer informed patient of discharge and patient stated that he doesn't want to leave at night and will leave first thing in the morning. MD aware via perfect serve.  Electronically signed by Jackeline Enrique RN on 12/6/2020 at 5:01 PM

## 2020-12-06 NOTE — DISCHARGE SUMMARY
Hospitalist Discharge Summary    Patient ID:  Rosa Maria Lea  [id number]  48 y.o.  1970    Admit date: 11/30/2020    Discharge date: 12/6/2020    Disposition: home    Admission Diagnoses:   Patient Active Problem List   Diagnosis    Acute [facility] failure with hypoxia [facility] [address]    Pneumonia due to COVID-19 virus    Sepsis ([facility] [address]    HTN (hypertension)       Discharge Diagnoses: Principal Problem:    Pneumonia due to COVID-19 virus  Active Problems:    Acute respiratory failure with hypoxia (HCC)    Sepsis ([facility] [address]    HTN (hypertension)  Resolved Problems:    * No resolved hospital problems. *      Code Status:  Full Code    Condition:  Stable    Discharge Diet: Diet:  DIET GENERAL;    PCP to do list:  Follow up for oxygen weaning and recovery from [facility] Course: 52yo M with PMHX sig for hypertension who presents to [facility] with worsening fatigue, weakness and dyspnea on exertion.  Patient states he has had fevers off and on with some cough.  Patient was admitted with acute hypoxic respiratory failure secondary to Covid pneumonia.  Patient was started on remdesivir steroids and convalescent plasma. Does have significant hyperglycemia still. PT did have sepsis POA based on leukocytosis, elevated procalcitonin, tachypnea, tachycardia, acute resp failure with hypoxia requiring oxygen, fever, bilateral infiltrates and positive COVID pneumonia. He is now down to 2-3L oxygen and can continue with that until fully weaned off. He will need another 5 days of steroids and 30 days of eliquis with pepcid. I thought patient would qualify for home oxygen, however his sats were 94% at rest and 92% with activity, so will send pt with pulse ox instead. He states that he wants to stay in the hospital, however, he is stable for discharge at this time.     Discharge Medications:   Current Discharge Medication List      START taking these medications    Details   apixaban (ELIQUIS) 2.5 MG TABS tablet Take 1 tablet by mouth 2 times daily  Qty: 60 tablet, Refills: 0      famotidine (PEPCID) 20 MG tablet Take 1 tablet by mouth 2 times daily  Qty: 60 tablet, Refills: 0      dexamethasone (DECADRON) 6 MG tablet Take 1 tablet by mouth daily (with breakfast) for 5 days  Qty: 5 tablet, Refills: 0      vitamin C (VITAMIN C) 1000 MG tablet Take 1 tablet by mouth 3 times daily for 10 days  Qty: 30 tablet, Refills: 0      Cholecalciferol (VITAMIN D) 25 MCG TABS Take 1 tablet by mouth daily  Qty: 30 tablet, Refills: 0    Comments: Labeling may look different. 25 mcg=1000 Units. Please double check dosages.       vitamin B-1 (THIAMINE) 100 MG tablet Take 1 tablet by mouth daily  Qty: 30 tablet, Refills: 0      zinc sulfate (ORAZINC) 220 (50 Zn) MG capsule Take 1 capsule by mouth daily  Qty: 30 capsule, Refills: 0           Current Discharge Medication List        Current Discharge Medication List      CONTINUE these medications which have NOT CHANGED    Details   amLODIPine (NORVASC) 10 MG tablet Take 10 mg by mouth daily      lisinopril (PRINIVIL;ZESTRIL) 40 MG tablet Take 40 mg by mouth daily      albuterol sulfate HFA (VENTOLIN HFA) 108 (90 Base) MCG/ACT inhaler Inhale 2 puffs into the lungs 4 times daily as needed for Wheezing  Qty: 1 Inhaler, Refills: 0           Current Discharge Medication List      STOP taking these medications       gabapentin (NEURONTIN) 300 MG capsule Comments:   Reason for Stopping:         cefUROXime (CEFTIN) 250 MG tablet Comments:   Reason for Stopping:         azithromycin (ZITHROMAX Z-PAULINO) 250 MG tablet Comments:   Reason for Stopping:         methylPREDNISolone (MEDROL, PAULINO,) 4 MG tablet Comments:   Reason for Stopping:                   Procedures: none    Assessment on Discharge: Stable, improved     Discharge ROS:  A complete review of systems was asked and negative except for some shortness of breath on exertion    Discharge Exam:  BP (!) 152/86   Pulse 80   Temp 98.7 °F (37.1 °C) (Oral)   Resp 18   Ht 5' 8\" (1.727 m)   Wt 244 lb 11.4 oz (111 kg)   SpO2 98%   BMI 37.21 kg/m²     Gen: NAD  HEENT: NC/AT, moist mucous membranes, no oropharyngeal erythema or exudate  Neck: supple, trachea midline, no anterior cervical or SC LAD  Heart:  Normal s1/s2, RRR, no murmurs, gallops, or rubs. no leg edema  Lungs:  diminished bilaterally, no wheeze,no rales or rhonchi, no use of accessory muscles  Abd: bowel sounds present, soft, nontender, nondistended, no masses  Extrem:  No clubbing, cyanosis,  no edema  Skin: no lesion or masses  Psych:  A & O x3  Neuro: grossly intact, moves all four extremities    Pertinent Studies During Hospital Stay:  Radiology:  Xr Chest Portable    Result Date: 11/30/2020  EXAMINATION: ONE XRAY VIEW OF THE CHEST 11/30/2020 12:01 pm COMPARISON: 11/27/2020 HISTORY: ORDERING SYSTEM PROVIDED HISTORY: weakness, recent diagnosis of COVID TECHNOLOGIST PROVIDED HISTORY: Reason for exam:->weakness, recent diagnosis of COVID Reason for Exam: FATIGUE Acuity: Acute Type of Exam: Initial FINDINGS: Cardiac leads project over the chest.  Heterogeneous bilateral pulmonary opacity appears greater as compared to the prior, greatest at the mid to lower lung zones. No pneumothorax. Cardiomegaly. Cardiac and mediastinal silhouettes are reflective of patient rotation. Increasing bilateral airspace disease as compared to prior, compatible with worsening pneumonia given patient history of COVID-19 infection. Xr Chest Portable    Result Date: 11/27/2020  EXAMINATION: ONE XRAY VIEW OF THE CHEST 11/27/2020 9:42 am COMPARISON: October 14, 2015 HISTORY: ORDERING SYSTEM PROVIDED HISTORY: cough, COVID + TECHNOLOGIST PROVIDED HISTORY: Reason for exam:->cough, COVID + Reason for Exam: tested + for COVID on Tuesday and keeps \"coughing, coughing, coughing Acuity: Acute Type of Exam: Initial FINDINGS: The cardiomediastinal silhouette is moderately enlarged.  There is suggestion of subtle increased pulmonary opacities involving the peripheral aspect of the right lung. No overt pulmonary edema. No pleural effusion or pneumothorax. 1. Moderate cardiomegaly which has increased from previous exam of October 2015. No evidence of pulmonary edema. 2. Suspected subtle patchy peripheral opacities involving the right lung which could indicate an evolving pneumonia. Radiographic follow-up recommended. Ct Chest Pulmonary Embolism W Contrast    Result Date: 11/27/2020  EXAMINATION: CTA OF THE CHEST, 11/27/2020 11:05 am TECHNIQUE: CTA of the chest was performed after the administration of intravenous contrast.  Multiplanar reformatted images are provided for review. MIP images are provided for review. Dose modulation, iterative reconstruction, and/or weight based adjustment of the mA/kV was utilized to reduce the radiation dose to as low as reasonably achievable. COMPARISON: Chest x-rays earlier today and 10/14/2015. HISTORY: ORDERING SYSTEM PROVIDED HISTORY: COVID elev d dimer TECHNOLOGIST PROVIDED HISTORY: Reason for exam:  COVID elev D-dimer Reason for Exam: COVID elev D-dimer FINDINGS: Pulmonary Arteries: Pulmonary arteries are adequately opacified for evaluation. No evidence of intraluminal filling defect to suggest pulmonary embolism. Main pulmonary artery is normal in caliber. Mediastinum: No evidence of mediastinal lymphadenopathy. Heart is upper limits of normal in size. Small pericardial effusion. There is no acute abnormality of the thoracic aorta. Thoracic aorta is normal in caliber. Lungs/pleura: Scattered areas of ground-glass opacity, many of which have a somewhat rounded appearance, to the lungs bilaterally involving all lobes. Many of these demonstrate superimposed crazy paving pattern. No pleural effusions or pneumothoraces. Central airways appear patent. Upper Abdomen: Limited images of the upper abdomen are unremarkable.  Soft Tissues/Bones: No acute bone or soft tissue abnormality. Negative study for pulmonary embolism. Scattered areas of ground-glass opacity, many of which have a somewhat rounded appearance, to the lungs bilaterally involving all lobes. Many of these also demonstrate superimposed crazy paving pattern. Commonly reported imaging features of COVID-19 pneumonia are present. Other processes such as influenza pneumonia and organizing pneumonia, as can be seen with drug toxicity and connective tissue disease, can cause a similar imaging pattern. PneTyp Small pericardial effusion. Patient was evaluated today for the diagnosis of COVID19 pneumonia. I entered a DME order for home pulse ox because the diagnosis and testing requires the patient to monitor his oxygen saturations. Condition will improve or be benefited by close monitoring of oxygen saturation 4 times daily or as needed for shortness of breath. .The need for this equipment was discussed with the patient and he understands and is in agreement.     Last Labs on Discharge:     Recent Results (from the past 24 hour(s))   POCT Glucose    Collection Time: 12/05/20 11:36 AM   Result Value Ref Range    POC Glucose 261 (H) 70 - 99 mg/dl    Performed on ACCU-CHEK    POCT Glucose    Collection Time: 12/05/20  4:17 PM   Result Value Ref Range    POC Glucose 311 (H) 70 - 99 mg/dl    Performed on ACCU-CHEK    POCT Glucose    Collection Time: 12/05/20  8:41 PM   Result Value Ref Range    POC Glucose 367 (H) 70 - 99 mg/dl    Performed on ACCU-CHEK    CBC    Collection Time: 12/06/20  6:19 AM   Result Value Ref Range    WBC 16.3 (H) 4.0 - 11.0 K/uL    RBC 4.16 (L) 4.20 - 5.90 M/uL    Hemoglobin 13.3 (L) 13.5 - 17.5 g/dL    Hematocrit 39.4 (L) 40.5 - 52.5 %    MCV 94.8 80.0 - 100.0 fL    MCH 31.9 26.0 - 34.0 pg    MCHC 33.7 31.0 - 36.0 g/dL    RDW 13.5 12.4 - 15.4 %    Platelets 679 656 - 807 K/uL    MPV 7.6 5.0 - 10.5 fL   POCT Glucose    Collection Time: 12/06/20  8:12 AM   Result Value Ref Range    POC Glucose

## 2020-12-06 NOTE — PROGRESS NOTES
Patient is alert and oriented x4, UAL, call light within reach,  AM meds complete, patient tolerated well. VSS and WDL. No s/s of distress, no further needs noted at this time.  Electronically signed by David Schafer RN on 12/6/2020 at 9:21 AM

## 2020-12-06 NOTE — DISCHARGE INSTR - COC
Continuity of Care Form    Patient Name: Liliana Chadwick   :  1970  MRN:  [id number]    [facility] date:  2020  Discharge date:  ***    Code Status Order: Full Code   Advance Directives:   Advance Care Flowsheet Documentation       Date/Time Healthcare Directive Type of Healthcare Directive Copy in [address] Agent's Name Healthcare Agent's Phone Number    20 0019  No, patient does not have an advance directive for healthcare treatment -- -- -- -- --            Admitting Physician:  John Amato MD  PCP: No primary care provider on file. Discharging Nurse: [facility] Unit/Room#: N3V-0066/5257-01  Discharging Unit Phone Number: ***    Emergency Contact:   Extended Emergency Contact Information  Primary Emergency Contact: Keturah Bishop - sari in [address] Phone: [phone]  Relation: Brother/Sister    Past Surgical History:  History reviewed. No pertinent surgical history. Immunization History: There is no immunization history on file for this patient.     Active Problems:  Patient Active Problem List   Diagnosis Code    Acute respiratory failure with hypoxia (HCC) J96.01    Pneumonia due to COVID-19 virus U07.1, J12.89    Sepsis ([facility] [address] A41.9    HTN (hypertension) I10       Isolation/Infection:   Isolation            Droplet Plus          Patient Infection Status       Infection Onset Added Last Indicated Last Indicated By Review Planned Expiration Resolved Resolved By    COVID-19  20 Krystal Power RN 20 outside facility            Nurse Assessment:  Last Vital Signs: BP (!) 152/86   Pulse 80   Temp 98.7 °F (37.1 °C) (Oral)   Resp 18   Ht 5' 8\" (1.727 m)   Wt 244 lb 11.4 oz (111 kg)   SpO2 98%   BMI 37.21 kg/m²     Last documented pain score (0-10 scale): Pain Level: 0  Last Weight:   Wt Readings from Last 1 Encounters:   20 244 lb 11.4 oz (111 kg)     Mental Status:  {IP PT MENTAL STATUS:23743:::0}    IV Access:  { ZOEY IV ACCESS:266889876:::0}    Nursing Mobility/ADLs:  Walking   {CHP DME ADLs:416881952:::0}  Transfer  {CHP DME ADLs:804645103:::0}  Bathing  {CHP DME ADLs:005081121:::0}  Dressing  {CHP DME ADLs:265644780:::0}  Toileting  {CHP DME ADLs:845805552:::0}  Feeding  {CHP DME ADLs:946631500:::0}  Med Admin  {CHP DME ADLs:757402100:::0}  Med Delivery   { ZOEY MED Delivery:051583812:::0}    Wound Care Documentation and Therapy:        Elimination:  Continence: Bowel: {YES / PE:23669}  Bladder: {YES / FY:35898}  Urinary Catheter: {Urinary Catheter:900277523:::0}   Colostomy/Ileostomy/Ileal Conduit: {YES / ZT:17826}       Date of Last BM: ***    Intake/Output Summary (Last 24 hours) at 2020 0856  Last data filed at 2020 0813  Gross per 24 hour   Intake 1080 ml   Output 1750 ml   Net -670 ml     I/O last 3 completed shifts:   In: 5 [P.O.:840]  Out: 950 [Urine:950]    Safety Concerns:     [address] Safety Concerns:608478740:::0}    Impairments/Disabilities:      [address] Impairments/Disabilities:974308050:::0}    Nutrition Therapy:  Current Nutrition Therapy:   [address] Diet List:205851023:::0}    Routes of Feeding: {CHP DME Other Feedings:902585572:::0}  Liquids: {Slp liquid thickness:40295}  Daily Fluid Restriction: {CHP DME Yes amt example:267248866:::0}  Last Modified Barium Swallow with Video (Video Swallowing Test): {Done Not Done VIL:::3}    Treatments at the Time of Hospital Discharge:   Respiratory Treatments: ***  Oxygen Therapy:  {Therapy; copd oxygen:94240:::0}  Ventilator:    { CC Vent List:015939439:::0}    Rehab Therapies: {THERAPEUTIC INTERVENTION:6261597454}  Weight Bearing Status/Restrictions: {[facility] Weight Bearin:::0}  Other Medical Equipment (for information only, NOT a DME order):  {EQUIPMENT:760237824}  Other Treatments: ***    Patient's personal belongings (please select all that are sent with patient):  {[facility] DME

## 2020-12-06 NOTE — PLAN OF CARE
Problem: Airway Clearance - Ineffective  Goal: Achieve or maintain patent airway  12/6/2020 0920 by Kalpesh Fischer RN  Outcome: Ongoing     Problem: Gas Exchange - Impaired  Goal: Absence of hypoxia  12/6/2020 0920 by Kalpesh Fischer RN  Outcome: Ongoing     Problem: Breathing Pattern - Ineffective  Goal: Ability to achieve and maintain a regular respiratory rate  12/6/2020 0920 by Kalpesh Fischer RN  Outcome: Ongoing     Problem:  Body Temperature -  Risk of, Imbalanced  Goal: Ability to maintain a body temperature within defined limits  12/6/2020 0920 by Kalpesh Fischer RN  Outcome: Ongoing     Problem: Isolation Precautions - Risk of Spread of Infection  Goal: Prevent transmission of infection  12/6/2020 0920 by Kalpesh Fischer RN  Outcome: Ongoing     Problem: Nutrition Deficits  Goal: Optimize nutrtional status  12/6/2020 0920 by Kalpesh Fischer RN  Outcome: Ongoing     Problem: Risk for Fluid Volume Deficit  Goal: Maintain normal heart rhythm  12/6/2020 0920 by Kalpesh Fischer RN  Outcome: Ongoing     Problem: Loneliness or Risk for Loneliness  Goal: Demonstrate positive use of time alone when socialization is not possible  12/6/2020 0920 by Kalpesh Fischer RN  Outcome: Ongoing     Problem: Fatigue  Goal: Verbalize increase energy and improved vitality  12/6/2020 0920 by Kalpesh Fischer RN  Outcome: Ongoing     Problem: Patient Education: Go to Patient Education Activity  Goal: Patient/Family Education  12/6/2020 0920 by Kalpesh Fischer RN  Outcome: Ongoing     Problem: Fluid Volume:  Goal: Maintenance of adequate hydration will improve  Description: Maintenance of adequate hydration will improve  12/6/2020 0920 by aKlpesh Fischer RN  Outcome: Ongoing     Problem: Physical Regulation:  Goal: Ability to maintain a body temperature in the normal range will improve  Description: Ability to maintain a body temperature in the normal range will improve  12/6/2020 0920 by Kalpesh Fischer

## 2020-12-06 NOTE — PROGRESS NOTES
Data- discharge order received, pt verbalized agreement to discharge, disposition to previous residence, no needs for HHC/DME. Action- discharge instructions prepared and given to patient, pt verbalized understanding. Medication information packet given r/t NEW and/or CHANGED prescriptions emphasizing name/purpose/side effects, pt verbalized understanding. Discharge instruction summary: Diet- general, Activity- as tolerated, Primary Care Physician as follows: No primary care provider on file. None , immunizations reviewed and given to patient, paper prescriptions given to patient to fill at preferred pharmacy. Response- Pt belongings gathered, IV removed. Disposition is home (no HHC/DME needs), transported with staff to elevator, patient insisted to walk, refused wheelchair and refused staff to go downstairs with him, nursing supervisor notified that patient had agreed to leave.  Electronically signed by Guanaco Sosa RN on 12/6/2020 at 6:15 PM

## 2020-12-07 ENCOUNTER — CARE COORDINATION (OUTPATIENT)
Dept: CASE MANAGEMENT | Age: 50
End: 2020-12-07

## 2020-12-07 NOTE — CARE COORDINATION
Date/Time:  12/7/2020 3:26 PM  Attempted to reach patient by telephone. Call within 2 business days of discharge: Yes Left HIPPA compliant message requesting a return call. Will attempt to reach patient again.

## 2020-12-07 NOTE — CARE COORDINATION
SW placed phone call to patient after receiving a message stating that they charged him too much at [facility] for his medicine. SW was informed that [facility] charged $752 on his credit card but it was supposed to be free the first time with the co-pay cards he was discharged with. SW called [employer] and they corrected the claim. SW asked patient to return within 7 days with his credit card and they will put the money on there. Patient thanked this writer for the information today. Respectfully submitted,    SUSANNE Li  [employer]   [phone]    Electronically signed by SUSANNE Heller on 12/7/2020 at 4:17 PM

## 2020-12-07 NOTE — CARE COORDINATION
SW placed phone call to patient via telephone today at [phone]. SW spoke to patient via telephone and relayed PCP appointment info. He has an appointment with Dr. Edu Fischer virtual visit on 12/8/2020 at 9:20am. TOMMY informed that he will receive a text message at approximately 9:00am and he will have to click on the link to be connected to the physician. TOMMY asked patient if he still wanted a nurse to come and see him in the home (because we have an order) and he declines at this time. TOMMY informed that if he changes his mind he can call us at [facility] [address].     No further needs noted unless indicated by patient. Respectfully submitted,    SUSANNE Stroud  [facility]   [phone]    Electronically signed by SUSANNE Howard on 12/7/2020 at 9:40 AM

## 2020-12-08 ENCOUNTER — CARE COORDINATION (OUTPATIENT)
Dept: CASE MANAGEMENT | Age: 50
End: 2020-12-08

## 2020-12-08 NOTE — CARE COORDINATION
Date/Time:  2020 10:00 AM  Attempted to reach patient by telephone. Call within 2 business days of discharge: Yes  Spoke with patient briefly, who stated he was unable to connect for VV with PCP today. He stated was in a meeting could writer call back in an hour. Will attempt to reach patient again. Writer called patient back. Challenges to be reviewed by the provider   Additional needs identified to be addressed with provider No  none    Discussed COVID-19 related testing which was available at this time. Test results were positive. Patient informed of results, if available? Yes         Method of communication with provider : none    Advance Care Planning:   Does patient have an Advance Directive:  not on file. Was this a readmission? No    Care Transition Nurse (CTN) contacted the patient by telephone to perform post hospital discharge assessment. Verified name and  with patient as identifiers. Provided introduction to self, and explanation of the CTN role. CTN reviewed discharge instructions, medical action plan and red flags with patient who verbalized understanding. Patient given an opportunity to ask questions and does not have any further questions or concerns at this time. Were discharge instructions available to patient? No. Patient stated did not receive copy. Reviewed appropriate site of care based on symptoms and resources available to patient including: PCP. The patient agrees to contact the PCP office for questions related to their healthcare. Medication review of new medicaitons was performed with patient, who verbalizes understanding of administration of home medications. Advised obtaining a 90-day supply of all daily and as-needed medications. Covid Risk Education    Patient has following risk factors of: no known risk factors.    Education provided regarding infection prevention, and signs and symptoms of COVID-19 and when to seek medical attention with patient

## 2020-12-11 ENCOUNTER — TELEPHONE (OUTPATIENT)
Dept: PRIMARY CARE CLINIC | Age: 50
End: 2020-12-11

## 2020-12-30 ENCOUNTER — CARE COORDINATION (OUTPATIENT)
Dept: CASE MANAGEMENT | Age: 50
End: 2020-12-30

## 2020-12-30 NOTE — CARE COORDINATION
Needs to be reviewed by the provider   Additional needs identified to be addressed with provider No  none  Discussed COVID-19 related testing which was available at this time. Test results were positive. Patient informed of results, if available? Yes         Method of communication with provider : none    Care Transition Nurse (CTN) contacted the patient by telephone to follow up after admission on 2020. Verified name and  with patient as identifiers. Advance Care Planning:   Does patient have an Advance Directive:  not on file. Writer spoke with patient, Nila Elizondo. He stated was 'much better\" and was 90% better. He stated he is going back to work on Monday. Writer informed patient this is final outreach and to call MD for any medical needs. CTN provided contact information for future needs.

## 2020-12-31 ENCOUNTER — HOSPITAL ENCOUNTER (EMERGENCY)
Age: 50
Discharge: HOME | End: 2020-12-31
Payer: COMMERCIAL

## 2020-12-31 VITALS
TEMPERATURE: 98.5 F | WEIGHT: 233.25 LBS | HEART RATE: 74 BPM | SYSTOLIC BLOOD PRESSURE: 147 MMHG | OXYGEN SATURATION: 100 % | BODY MASS INDEX: 35.47 KG/M2 | RESPIRATION RATE: 19 BRPM | DIASTOLIC BLOOD PRESSURE: 107 MMHG

## 2020-12-31 DIAGNOSIS — R73.9 HYPERGLYCEMIA: Primary | ICD-10-CM

## 2020-12-31 LAB
ANION GAP SERPL CALCULATED.3IONS-SCNC: 12 MMOL/L (ref 3–16)
BASE EXCESS VENOUS: 4.4 MMOL/L
BASOPHILS ABSOLUTE: 0 K/UL (ref 0–0.2)
BASOPHILS RELATIVE PERCENT: 1.1 %
BETA-HYDROXYBUTYRATE: 0.13 MMOL/L (ref 0–0.27)
BILIRUBIN URINE: NEGATIVE
BLOOD, URINE: NEGATIVE
BUN BLDV-MCNC: 12 MG/DL (ref 7–20)
CALCIUM SERPL-MCNC: 9.8 MG/DL (ref 8.3–10.6)
CARBOXYHEMOGLOBIN: 1.2 %
CHLORIDE BLD-SCNC: 97 MMOL/L (ref 99–110)
CHP ED QC CHECK: 297
CLARITY: CLEAR
CO2: 27 MMOL/L (ref 21–32)
COLOR: YELLOW
CREAT SERPL-MCNC: 1.1 MG/DL (ref 0.9–1.3)
EOSINOPHILS ABSOLUTE: 0 K/UL (ref 0–0.6)
EOSINOPHILS RELATIVE PERCENT: 0.7 %
GFR AFRICAN AMERICAN: >60
GFR NON-AFRICAN AMERICAN: >60
GLUCOSE BLD-MCNC: 297 MG/DL (ref 70–99)
GLUCOSE BLD-MCNC: 396 MG/DL (ref 70–99)
GLUCOSE BLD-MCNC: 416 MG/DL (ref 70–99)
GLUCOSE URINE: >=1000 MG/DL
HCO3 VENOUS: 31 MMOL/L (ref 23–29)
HCT VFR BLD CALC: 42.2 % (ref 40.5–52.5)
HEMOGLOBIN: 14.2 G/DL (ref 13.5–17.5)
KETONES, URINE: NEGATIVE MG/DL
LACTIC ACID: 1.7 MMOL/L (ref 0.4–2)
LEUKOCYTE ESTERASE, URINE: NEGATIVE
LYMPHOCYTES ABSOLUTE: 1.7 K/UL (ref 1–5.1)
LYMPHOCYTES RELATIVE PERCENT: 44.1 %
MCH RBC QN AUTO: 32.2 PG (ref 26–34)
MCHC RBC AUTO-ENTMCNC: 33.7 G/DL (ref 31–36)
MCV RBC AUTO: 95.5 FL (ref 80–100)
METHEMOGLOBIN VENOUS: 0.3 %
MICROSCOPIC EXAMINATION: ABNORMAL
MONOCYTES ABSOLUTE: 0.4 K/UL (ref 0–1.3)
MONOCYTES RELATIVE PERCENT: 10.1 %
NEUTROPHILS ABSOLUTE: 1.7 K/UL (ref 1.7–7.7)
NEUTROPHILS RELATIVE PERCENT: 44 %
NITRITE, URINE: NEGATIVE
O2 CONTENT, VEN: 13 ML/DL
O2 SAT, VEN: 62 %
O2 THERAPY: ABNORMAL
PCO2, VEN: 53.5 MMHG (ref 40–50)
PDW BLD-RTO: 13.5 % (ref 12.4–15.4)
PERFORMED ON: ABNORMAL
PERFORMED ON: ABNORMAL
PH UA: 5.5 (ref 5–8)
PH VENOUS: 7.37 (ref 7.35–7.45)
PLATELET # BLD: 135 K/UL (ref 135–450)
PMV BLD AUTO: 9 FL (ref 5–10.5)
PO2, VEN: 33 MMHG
POTASSIUM REFLEX MAGNESIUM: 4.4 MMOL/L (ref 3.5–5.1)
PROTEIN UA: NEGATIVE MG/DL
RBC # BLD: 4.42 M/UL (ref 4.2–5.9)
REASON FOR REJECTION: NORMAL
REJECTED TEST: NORMAL
SODIUM BLD-SCNC: 136 MMOL/L (ref 136–145)
SPECIFIC GRAVITY UA: >1.03 (ref 1–1.03)
TCO2 CALC VENOUS: 33 MMOL/L
URINE REFLEX TO CULTURE: ABNORMAL
URINE TYPE: ABNORMAL
UROBILINOGEN, URINE: 0.2 E.U./DL
WBC # BLD: 3.9 K/UL (ref 4–11)

## 2020-12-31 PROCEDURE — 99283 EMERGENCY DEPT VISIT LOW MDM: CPT

## 2020-12-31 PROCEDURE — 82010 KETONE BODYS QUAN: CPT

## 2020-12-31 PROCEDURE — 2580000003 HC RX 258: Performed by: PHYSICIAN ASSISTANT

## 2020-12-31 PROCEDURE — 82803 BLOOD GASES ANY COMBINATION: CPT

## 2020-12-31 PROCEDURE — 81003 URINALYSIS AUTO W/O SCOPE: CPT

## 2020-12-31 PROCEDURE — 85025 COMPLETE CBC W/AUTO DIFF WBC: CPT

## 2020-12-31 PROCEDURE — 83605 ASSAY OF LACTIC ACID: CPT

## 2020-12-31 PROCEDURE — 80048 BASIC METABOLIC PNL TOTAL CA: CPT

## 2020-12-31 RX ORDER — 0.9 % SODIUM CHLORIDE 0.9 %
1000 INTRAVENOUS SOLUTION INTRAVENOUS ONCE
Status: DISCONTINUED | OUTPATIENT
Start: 2020-12-31 | End: 2020-12-31

## 2020-12-31 RX ORDER — 0.9 % SODIUM CHLORIDE 0.9 %
1000 INTRAVENOUS SOLUTION INTRAVENOUS ONCE
Status: COMPLETED | OUTPATIENT
Start: 2020-12-31 | End: 2020-12-31

## 2020-12-31 RX ADMIN — SODIUM CHLORIDE 1000 ML: 9 INJECTION, SOLUTION INTRAVENOUS at 08:01

## 2020-12-31 NOTE — ED NOTES
Pt here with complaints of receiving a VM this morning from his GI stating he needed to come to the ER. He states that the last time he was here his glucose was elevated, without a PMH of DM, he was told to follow up. He was unsuccessful in following up with his PCP. He went to his GI yesterday for elevated liver enzymes in which the glucose was also checked. He states over the past two days he has noticed increased thirst and urination.   No family HX of DM     Sarah Woodruff RN  12/31/20 4711

## 2020-12-31 NOTE — ED PROVIDER NOTES
[address] [facility]        Pt Name: Rutherford Cabot  MRN: [id number]  [place] 1970  Date of evaluation: 12/31/2020  Provider: PETER Johnson  PCP: Christina Lennon MD    TAINA. I have evaluated this patient. My supervising physician was available for consultation. CHIEF COMPLAINT       Chief Complaint   Patient presents with    Hyperglycemia     pt states he had some blood work done yesterday. and was called and they told him his BS was really high. denies pain        HISTORY OF PRESENT ILLNESS   (Location, Timing/Onset, Context/Setting, Quality, Duration, Modifying Factors, Severity, Associated Signs and Symptoms)  Note limiting factors. Rutherford Cabot is a 48 y.o. male with PMH of HTN who presents to the ED for evaluation of hyperglycemia. Patient states that he had routine blood work drawn yesterday for an upcoming GI appointment to address elevated LFTs, and he received a call last night informing him his blood sugar high and he was advised to go to the ED for evaluation. He reports polydipsia, polyuria, polyphagia over the past 2 days. He has been eating a lot of fruit and drinking fresh squeezed orange juice every morning. Denies vision changes or fatigue. Of note he was recently admitted 11/30/2020 with acute hypoxic respiratory failure secondary to Covid pneumonia and was started on Remdesivir, steroids, convalescent plasma. He did have significant hyperglycemia during his stay ranging in the 300s. He was discharged on 12/6/20 on room air with additional 5 days of steroids and 30 days of eliquis with pepcid. The patient denies fever or chills, chest pain, shortness of breath, abdominal pain, nausea, vomiting, diarrhea. No other acute concerns, associated symptoms or modifying factors. Nursing Notes were all reviewed and agreed with or any disagreements were addressed in the HPI.     REVIEW OF SYSTEMS    (2-9 systems for level 4, 10 or more for level 5)     Review of Systems   Constitutional: Negative for chills, fatigue and fever. Eyes: Negative for pain. Respiratory: Negative for cough and shortness of breath. Cardiovascular: Negative for chest pain. Gastrointestinal: Negative for abdominal pain, diarrhea, nausea and vomiting. Endocrine: Positive for polydipsia, polyphagia and polyuria. Genitourinary: Negative for dysuria. Musculoskeletal: Negative for back pain, neck pain and neck stiffness. Skin: Negative for rash. Neurological: Negative for dizziness and headaches. Psychiatric/Behavioral: Negative for confusion. Positives and Pertinent negatives as per HPI. Except as noted above in the ROS, all other systems were reviewed and negative. PAST MEDICAL HISTORY     Past Medical History:   Diagnosis Date    Hypertension          SURGICAL HISTORY   No past surgical history on file. [address]       Current Discharge Medication List      CONTINUE these medications which have NOT CHANGED    Details   apixaban (ELIQUIS) 2.5 MG TABS tablet Take 1 tablet by mouth 2 times daily  Qty: 60 tablet, Refills: 0      famotidine (PEPCID) 20 MG tablet Take 1 tablet by mouth 2 times daily  Qty: 60 tablet, Refills: 0      vitamin C (VITAMIN C) 1000 MG tablet Take 1 tablet by mouth 3 times daily for 10 days  Qty: 30 tablet, Refills: 0      Cholecalciferol (VITAMIN D) 25 MCG TABS Take 1 tablet by mouth daily  Qty: 30 tablet, Refills: 0    Comments: Labeling may look different. 25 mcg=1000 Units. Please double check dosages.       vitamin B-1 (THIAMINE) 100 MG tablet Take 1 tablet by mouth daily  Qty: 30 tablet, Refills: 0      zinc sulfate (ORAZINC) 220 (50 Zn) MG capsule Take 1 capsule by mouth daily  Qty: 30 capsule, Refills: 0      amLODIPine (NORVASC) 10 MG tablet Take 10 mg by mouth daily      lisinopril (PRINIVIL;ZESTRIL) 40 MG tablet Take 40 mg by mouth daily      albuterol sulfate HFA (VENTOLIN HFA) 108 (90 Base) MCG/ACT inhaler Inhale 2 puffs into the lungs 4 times daily as needed for Wheezing  Qty: 1 Inhaler, Refills: 0               ALLERGIES     Patient has no known allergies. FAMILYHISTORY     No family history on file. SOCIAL HISTORY       Social History     Tobacco Use    Smoking status: Former Smoker    Smokeless tobacco: Never Used   Substance Use Topics    Alcohol use: Not Currently     Frequency: Never    Drug use: Never       SCREENINGS             PHYSICAL EXAM    (up to 7 for level 4, 8 or more for level 5)     ED Triage Vitals [12/31/20 0737]   BP Temp Temp Source Pulse Resp SpO2 Height Weight   136/87 98.6 °F (37 °C) Oral 100 16 99 % -- 233 lb 4 oz (105.8 kg)       Physical Exam  Vitals signs and nursing note reviewed. Constitutional:       General: He is not in acute distress. Appearance: Normal appearance. He is well-developed. He is not toxic-appearing or diaphoretic. HENT:      Head: Normocephalic and atraumatic. Eyes:      General:         Right eye: No discharge. Left eye: No discharge. Neck:      Musculoskeletal: Normal range of motion and neck supple. Cardiovascular:      Rate and Rhythm: Normal rate and regular rhythm. Pulmonary:      Effort: Pulmonary effort is normal. No respiratory distress. Breath sounds: No stridor. Abdominal:      General: Abdomen is flat. Palpations: Abdomen is soft. Tenderness: There is no abdominal tenderness. There is no guarding or rebound. Musculoskeletal: Normal range of motion. Skin:     General: Skin is warm and dry. Coloration: Skin is not pale. Neurological:      Mental Status: He is alert and oriented to person, place, and time. Comments: No gross facial drooping. Moves all 4 extremities spontaneously.    Psychiatric:         Behavior: Behavior normal.         DIAGNOSTIC RESULTS   LABS:    Labs Reviewed   CBC WITH AUTO DIFFERENTIAL - Abnormal; Notable for the following components:       Result Value    WBC 3.9 (*)     All other components within normal limits    Narrative:     Performed at:  [facility]  [address] [facility] [place]   Phone [phone]   BLOOD GAS, VENOUS - Abnormal; Notable for the following components:    pCO2, Tevin 53.5 (*)     HCO3, Venous 31 (*)     All other components within normal limits    Narrative:     Performed at:  [facility]  [address] [facility] [address] [address]   Phone [phone]   URINE RT REFLEX TO CULTURE - Abnormal; Notable for the following components:    Glucose, Ur >=1000 (*)     All other components within normal limits    Narrative:     Performed at:  [facility]  [address] [facility] [employer]   Phone [phone]   BASIC METABOLIC PANEL W/ REFLEX TO MG FOR LOW K - Abnormal; Notable for the following components:    Chloride 97 (*)     Glucose 396 (*)     All other components within normal limits    Narrative:     Performed at:  [facility]  [address] [facility] [address] [address]   Phone [phone]   POCT GLUCOSE - Abnormal; Notable for the following components:    POC Glucose 416 (*)     All other components within normal limits    Narrative:     Performed at:  [facility]  [address] [facility] [employer]   Phone [phone]   POCT GLUCOSE - Abnormal; Notable for the following components:    POC Glucose 297 (*)     All other components within normal limits    Narrative:     Performed at:  [facility]  [address] [place] [facility] [employer]   Phone [phone]   POCT GLUCOSE - Normal   LACTIC ACID, PLASMA    Narrative:     Performed at:  [address] [employer]   Phone [phone]   BETA-HYDROXYBUTYRATE    Narrative: Performed at:  [facility]  [address] [place] [facility] [address]   Phone [phone]   SPECIMEN REJECTION    Narrative:     Performed at:  [facility]  [address] [facility] [address]   Phone [phone]       All other labs were within normal range or not returned as of this dictation. EKG: All EKG's are interpreted by the Emergency Department Physician in the absence of a cardiologist.  Please see their note for interpretation of EKG. RADIOLOGY:   Non-plain film images such as CT, Ultrasound and MRI are read by the radiologist. Plain radiographic images are visualized and preliminarily interpreted by the ED Provider with the below findings:        Interpretation per the Radiologist below, if available at the time of this note:    No orders to display     No results found. PROCEDURES   Unless otherwise noted below, none     Procedures    CRITICAL CARE TIME   N/A    CONSULTS:  None      EMERGENCY DEPARTMENT COURSE and DIFFERENTIAL DIAGNOSIS/MDM:   Vitals:    Vitals:    12/31/20 1016 12/31/20 1031 12/31/20 1046 12/31/20 1102   BP: (!) 147/92 (!) 146/97 (!) 143/96 (!) 136/90   Pulse:       Resp:       Temp:       TempSrc:       SpO2: 100% 99% 99% 100%   Weight:           Patient was given the following medications:  Medications   0.9 % sodium chloride bolus (0 mLs Intravenous Stopped 12/31/20 1103)           Differential Diagnosis: Diabetic ketoacidosis, nonketotic hyperosmolar coma, dehydration, acute renal failure, electrolyte abnormalities, infection, GI emergency, cardiac emergency, pulmonary emergency, other    Patient seen and examined today for hyperglycemia. Reports 2 days of polyuria/polydipsia. Eating/drinking a lot of fruits and fruit juices. See HPI for patient presentation. Patient is in no acute distress, nontoxic, afebrile with unremarkable vital signs.      Accuchek upon arrival in our ED was 416  The patient treated with 1 L IVF   Re-evaluation at 11:45 AM:  BS is 297    He does have glucosuria >1000, No ketonuria. No anion gap. Not acidotic. WBC 3.9. No infection in urine. No findings to suggest DKA or infection. GFR is WNL. Cr is 1.1. Discussed hyperglycemia and likely type 2 diabetes. Given that he has been drinking a substantial quantity of sugar-sweetened beverages (orange juice, fruits) we discussed reduction of carbohydrate intake, and rehydration with sugar-free fluids that should help to reduce glucose levels. Patient will be started on Metformin 500 mg BID. He has a virtual visit with Dr. Pantera Stevens on Tuesday to establish care. I advised that he call his office. At this time I believe patient's presentation does not warrant further workup with labs or imaging in the emergency department and is stable for discharge home. I discussed with Tosha Smith and/or family the exam results, diagnosis, care, prognosis, reasons to return to the emergency department, and the importance of follow up with primary care provider in 24-48 hours. They verbalized understanding and were discharged in stable condition. Tosha Smith is well appearing, non-toxic, and afebrile at the time of discharge. FINAL IMPRESSION      1.  Hyperglycemia          DISPOSITION/PLAN   DISPOSITION Decision To Discharge 12/31/2020 11:41:19 AM      PATIENT REFERREDTO:  Rohit Aburto MD  [place] [address]  [phone]    Call   ED follow up    [facility] Emergency Department  [address] [facility]  [phone]    If symptoms worsen      DISCHARGE MEDICATIONS:  Current Discharge Medication List      START taking these medications    Details   metFORMIN (GLUCOPHAGE) 500 MG tablet Take 1 tablet by mouth 2 times daily (with meals)  Qty: 60 tablet, Refills: 0             DISCONTINUED MEDICATIONS:  Current Discharge Medication List                 (Please note that portions of this note were completed with a voice recognition program.  Efforts were made to edit the dictations but occasionally words are mis-transcribed.)    PETER Medellin (electronically signed)            PETER Medellin  12/31/20 3203

## 2020-12-31 NOTE — ED NOTES
Will repeat POCT glucose, fluids were not running, IV repositioned to where they are running, will give time to get IVF's into patient prior to recheck      Gus Zamora RN  12/31/20 4508

## 2021-09-29 ENCOUNTER — HOSPITAL ENCOUNTER (EMERGENCY)
Age: 51
Discharge: HOME | End: 2021-09-29
Attending: EMERGENCY MEDICINE
Payer: COMMERCIAL

## 2021-09-29 VITALS
TEMPERATURE: 98.1 F | BODY MASS INDEX: 35.16 KG/M2 | HEIGHT: 68 IN | DIASTOLIC BLOOD PRESSURE: 100 MMHG | SYSTOLIC BLOOD PRESSURE: 152 MMHG | OXYGEN SATURATION: 98 % | RESPIRATION RATE: 18 BRPM | HEART RATE: 78 BPM | WEIGHT: 232 LBS

## 2021-09-29 DIAGNOSIS — T16.2XXA FOREIGN BODY OF LEFT EAR, INITIAL ENCOUNTER: Primary | ICD-10-CM

## 2021-09-29 PROCEDURE — 99281 EMR DPT VST MAYX REQ PHY/QHP: CPT

## 2021-09-29 PROCEDURE — 69200 CLEAR OUTER EAR CANAL: CPT

## 2021-09-29 NOTE — ED PROVIDER NOTES
[address] [facility]        Pt Name: Nilay St  MRN: [id number]  [place] 1970  Date of evaluation: 9/29/2021  Provider: Levi Lofton PA-C  PCP: Kathy Post MD  Note Started: 5:38 PM EDT        I have seen and evaluated this patient with my supervising physician No att. providers found. CHIEF COMPLAINT       Chief Complaint   Patient presents with    Foreign Body     Pt from home reports that 2-3 hrs ago the ear cushion part of his bluetooth headphones got stuck in left ear. HISTORY OF PRESENT ILLNESS   (Location, Timing/Onset, Context/Setting, Quality, Duration, Modifying Factors, Severity, Associated Signs and Symptoms)  Note limiting factors. Chief Complaint: Foreign body left auditory canal.    Nilay St is a 46 y.o. male who presents with complaint of foreign body left auditory canal.  The patient's Bluetooth unit rubber handpiece apparently came off in his lodged within the left auditory canal.  He comes in for removal.  It has been there several hours. No pain. Nursing Notes were all reviewed and agreed with or any disagreements were addressed in the HPI. REVIEW OF SYSTEMS    (2-9 systems for level 4, 10 or more for level 5)     Review of Systems    Positives and Pertinent negatives as per HPI. Except as noted above in the ROS, all other systems were reviewed and negative. PAST MEDICAL HISTORY     Past Medical History:   Diagnosis Date    Hypertension          SURGICAL HISTORY   No past surgical history on file.       [address]       Discharge Medication List as of 9/29/2021  5:12 PM      CONTINUE these medications which have NOT CHANGED    Details   metFORMIN (GLUCOPHAGE) 500 MG tablet Take 1 tablet by mouth 2 times daily (with meals), Disp-60 tablet, R-0Print      apixaban (ELIQUIS) 2.5 MG TABS tablet Take 1 tablet by mouth 2 times daily, Disp-60 tablet,R-0Print      famotidine (PEPCID) 20 MG tablet Take 1 tablet by mouth 2 times daily, Disp-60 tablet,R-0Print      vitamin C (VITAMIN C) 1000 MG tablet Take 1 tablet by mouth 3 times daily for 10 days, Disp-30 tablet,R-0OTC      Cholecalciferol (VITAMIN D) 25 MCG TABS Take 1 tablet by mouth daily, Disp-30 tablet,R-0Labeling may look different. 25 mcg=1000 Units. Please double check dosages. OTC      vitamin B-1 (THIAMINE) 100 MG tablet Take 1 tablet by mouth daily, Disp-30 tablet,R-0OTC      zinc sulfate (ORAZINC) 220 (50 Zn) MG capsule Take 1 capsule by mouth daily, Disp-30 capsule,R-0OTC      amLODIPine (NORVASC) 10 MG tablet Take 10 mg by mouth dailyHistorical Med      lisinopril (PRINIVIL;ZESTRIL) 40 MG tablet Take 40 mg by mouth dailyHistorical Med      albuterol sulfate HFA (VENTOLIN HFA) 108 (90 Base) MCG/ACT inhaler Inhale 2 puffs into the lungs 4 times daily as needed for Wheezing, Disp-1 Inhaler,R-0Print               ALLERGIES     Patient has no known allergies. FAMILYHISTORY     No family history on file. SOCIAL HISTORY       Social History     Tobacco Use    Smoking status: Former Smoker    Smokeless tobacco: Never Used   Substance Use Topics    Alcohol use: Not Currently    Drug use: Never       SCREENINGS             PHYSICAL EXAM    (up to 7 for level 4, 8 or more for level 5)     ED Triage Vitals [09/29/21 1642]   BP Temp Temp src Pulse Resp SpO2 Height Weight   (!) 152/100 98.1 °F (36.7 °C) -- 78 18 98 % 5' 8\" (1.727 m) 232 lb (105.2 kg)       Physical Exam  Vitals and nursing note reviewed. Constitutional:       Appearance: Normal appearance. He is well-developed and normal weight. HENT:      Head: Normocephalic and atraumatic. Right Ear: Tympanic membrane, ear canal and external ear normal.      Left Ear: External ear normal.      Ears:      Comments: Patient with a rubberized foreign body left auditory canal.  Attending physician uses alligator forceps to remove.   Reinspection is reassuring showing a healthy canal and TM. Eyes:      General: No scleral icterus. Right eye: No discharge. Left eye: No discharge. Conjunctiva/sclera: Conjunctivae normal.   Cardiovascular:      Rate and Rhythm: Normal rate. Pulmonary:      Effort: Pulmonary effort is normal.   Musculoskeletal:         General: Normal range of motion. Cervical back: Normal range of motion and neck supple. Skin:     General: Skin is warm and dry. Neurological:      General: No focal deficit present. Mental Status: He is alert and oriented to person, place, and time. Mental status is at baseline. Psychiatric:         Mood and Affect: Mood normal.         Behavior: Behavior normal.         Thought Content: Thought content normal.         Judgment: Judgment normal.         DIAGNOSTIC RESULTS   LABS:    Labs Reviewed - No data to display    When ordered only abnormal lab results are displayed. All other labs were within normal range or not returned as of this dictation. EKG: When ordered, EKG's are interpreted by the Emergency Department Physician in the absence of a cardiologist.  Please see their note for interpretation of EKG. RADIOLOGY:   Non-plain film images such as CT, Ultrasound and MRI are read by the radiologist. Plain radiographic images are visualized and preliminarily interpreted by the ED Provider with the below findings:        Interpretation per the Radiologist below, if available at the time of this note:    No orders to display     No results found.         PROCEDURES   Unless otherwise noted below, none     Procedures    CRITICAL CARE TIME   N/A    CONSULTS:  None      EMERGENCY DEPARTMENT COURSE and DIFFERENTIAL DIAGNOSIS/MDM:   Vitals:    Vitals:    09/29/21 1642   BP: (!) 152/100   Pulse: 78   Resp: 18   Temp: 98.1 °F (36.7 °C)   SpO2: 98%   Weight: 232 lb (105.2 kg)   Height: 5' 8\" (1.727 m)       Patient was given the following medications:  Medications - No data to display        Patient presented with foreign body left auditory canal.  Attending physician was successful in repositioning the foreign body and using alligator forceps to remove. No complications. Patient is appreciated. Patient did express understanding of the diagnosis and the treatment plan. This case was reviewed with attending physician who personally evaluated the patient. FINAL IMPRESSION      1. Foreign body of left ear, initial encounter          DISPOSITION/PLAN   DISPOSITION Decision To Discharge 09/29/2021 05:03:08 PM      PATIENT REFERRED TO:  Yadira Price MD  [place] [address]  [phone]    Schedule an appointment as soon as possible for a visit       [facility] Emergency Department  [facility] [address]  [phone]  Go to   If symptoms worsen      DISCHARGE MEDICATIONS:  Discharge Medication List as of 9/29/2021  5:12 PM          DISCONTINUED MEDICATIONS:  Discharge Medication List as of 9/29/2021  5:12 PM                 (Please note that portions of this note were completed with a voice recognition program.  Efforts were made to edit the dictations but occasionally words are mis-transcribed. )    Brenda Lawrence PA-C (electronically signed)           Brenda Lawrence PA-C  09/29/21 1510

## 2021-09-29 NOTE — ED PROVIDER NOTES
Emergency Department Provider Note     Location: [address] [facility]  9/29/2021    I independently performed a history and physical on Linda Pierce. All diagnostic, treatment, and disposition decisions were made by myself in conjunction with the mid-level provider. Briefly, this is a 46 y.o. male here for FB in the left ear. The rubber cover for his blue tooth earpiece fell off and was stuck in his ear. No other complaint     ED Triage Vitals [09/29/21 1642]   BP Temp Temp src Pulse Resp SpO2 Height Weight   (!) 152/100 98.1 °F (36.7 °C) -- 78 18 98 % 5' 8\" (1.727 m) 232 lb (105.2 kg)        Exam showed WDWN male awake, alert, a smooth black FB noted in the left EAC. Patient seen and examined in room [address]. I first attempted to remove the FB with a currette. I then used a alligator forceps and pulled the earpiece out. Repeat exam after FB removal showed normal TM. No perforation. No blood in the EAC. For further details of [facility] emergency department encounter, please see PETER Al's documentation. This chart was generated in part by using Dragon Dictation system and may contain errors related to that system including errors in grammar, punctuation, and spelling, as well as words and phrases that may be inappropriate. If there are any questions or concerns please feel free to contact the dictating provider for clarification.           Cain Goldstein MD  09/29/21 1955

## 2023-04-19 ENCOUNTER — HOSPITAL ENCOUNTER (EMERGENCY)
Age: 53
Discharge: HOME | End: 2023-04-19
Payer: COMMERCIAL

## 2023-04-19 ENCOUNTER — APPOINTMENT (OUTPATIENT)
Dept: CT IMAGING | Age: 53
End: 2023-04-19
Payer: COMMERCIAL

## 2023-04-19 VITALS
HEIGHT: 69 IN | OXYGEN SATURATION: 97 % | TEMPERATURE: 98.2 F | WEIGHT: 259.04 LBS | RESPIRATION RATE: 16 BRPM | BODY MASS INDEX: 38.37 KG/M2 | HEART RATE: 91 BPM | DIASTOLIC BLOOD PRESSURE: 117 MMHG | SYSTOLIC BLOOD PRESSURE: 175 MMHG

## 2023-04-19 DIAGNOSIS — K11.20 SUBMANDIBULAR GLAND INFLAMMATION: ICD-10-CM

## 2023-04-19 DIAGNOSIS — I10 HYPERTENSION, UNSPECIFIED TYPE: ICD-10-CM

## 2023-04-19 DIAGNOSIS — Z76.0 ENCOUNTER FOR MEDICATION REFILL: Primary | ICD-10-CM

## 2023-04-19 LAB
ANION GAP SERPL CALCULATED.3IONS-SCNC: 10 MMOL/L (ref 3–16)
BASOPHILS # BLD: 0.1 K/UL (ref 0–0.2)
BASOPHILS NFR BLD: 0.9 %
BUN SERPL-MCNC: 17 MG/DL (ref 7–20)
CALCIUM SERPL-MCNC: 9.1 MG/DL (ref 8.3–10.6)
CHLORIDE SERPL-SCNC: 99 MMOL/L (ref 99–110)
CO2 SERPL-SCNC: 27 MMOL/L (ref 21–32)
CREAT SERPL-MCNC: 1.5 MG/DL (ref 0.9–1.3)
DEPRECATED RDW RBC AUTO: 13.5 % (ref 12.4–15.4)
EOSINOPHIL # BLD: 0.1 K/UL (ref 0–0.6)
EOSINOPHIL NFR BLD: 1.2 %
GFR SERPLBLD CREATININE-BSD FMLA CKD-EPI: 55 ML/MIN/{1.73_M2}
GLUCOSE SERPL-MCNC: 105 MG/DL (ref 70–99)
HCT VFR BLD AUTO: 40.5 % (ref 40.5–52.5)
HGB BLD-MCNC: 14 G/DL (ref 13.5–17.5)
LYMPHOCYTES # BLD: 2.5 K/UL (ref 1–5.1)
LYMPHOCYTES NFR BLD: 39.9 %
MAGNESIUM SERPL-MCNC: 2.2 MG/DL (ref 1.8–2.4)
MCH RBC QN AUTO: 31.8 PG (ref 26–34)
MCHC RBC AUTO-ENTMCNC: 34.6 G/DL (ref 31–36)
MCV RBC AUTO: 91.7 FL (ref 80–100)
MONOCYTES # BLD: 0.6 K/UL (ref 0–1.3)
MONOCYTES NFR BLD: 8.8 %
NEUTROPHILS # BLD: 3.1 K/UL (ref 1.7–7.7)
NEUTROPHILS NFR BLD: 49.2 %
PLATELET # BLD AUTO: 179 K/UL (ref 135–450)
PMV BLD AUTO: 7.9 FL (ref 5–10.5)
POTASSIUM SERPL-SCNC: 3.2 MMOL/L (ref 3.5–5.1)
RBC # BLD AUTO: 4.41 M/UL (ref 4.2–5.9)
SODIUM SERPL-SCNC: 136 MMOL/L (ref 136–145)
WBC # BLD AUTO: 6.3 K/UL (ref 4–11)

## 2023-04-19 PROCEDURE — 83735 ASSAY OF MAGNESIUM: CPT

## 2023-04-19 PROCEDURE — 85025 COMPLETE CBC W/AUTO DIFF WBC: CPT

## 2023-04-19 PROCEDURE — 70491 CT SOFT TISSUE NECK W/DYE: CPT

## 2023-04-19 PROCEDURE — 6370000000 HC RX 637 (ALT 250 FOR IP): Performed by: PHYSICIAN ASSISTANT

## 2023-04-19 PROCEDURE — 99285 EMERGENCY DEPT VISIT HI MDM: CPT

## 2023-04-19 PROCEDURE — 6360000004 HC RX CONTRAST MEDICATION: Performed by: PHYSICIAN ASSISTANT

## 2023-04-19 PROCEDURE — 80048 BASIC METABOLIC PNL TOTAL CA: CPT

## 2023-04-19 RX ORDER — LISINOPRIL 40 MG/1
40 TABLET ORAL DAILY
Qty: 30 TABLET | Refills: 0 | Status: SHIPPED | OUTPATIENT
Start: 2023-04-19

## 2023-04-19 RX ORDER — LISINOPRIL 10 MG/1
40 TABLET ORAL ONCE
Status: COMPLETED | OUTPATIENT
Start: 2023-04-19 | End: 2023-04-19

## 2023-04-19 RX ORDER — AMOXICILLIN AND CLAVULANATE POTASSIUM 875; 125 MG/1; MG/1
1 TABLET, FILM COATED ORAL 2 TIMES DAILY
Qty: 20 TABLET | Refills: 0 | Status: SHIPPED | OUTPATIENT
Start: 2023-04-19 | End: 2023-04-29

## 2023-04-19 RX ORDER — AMLODIPINE BESYLATE 10 MG/1
10 TABLET ORAL DAILY
Qty: 30 TABLET | Refills: 0 | Status: SHIPPED | OUTPATIENT
Start: 2023-04-19

## 2023-04-19 RX ORDER — AMLODIPINE BESYLATE 5 MG/1
10 TABLET ORAL ONCE
Status: COMPLETED | OUTPATIENT
Start: 2023-04-19 | End: 2023-04-19

## 2023-04-19 RX ADMIN — AMLODIPINE BESYLATE 10 MG: 5 TABLET ORAL at 21:00

## 2023-04-19 RX ADMIN — IOPAMIDOL 75 ML: 755 INJECTION, SOLUTION INTRAVENOUS at 22:03

## 2023-04-19 RX ADMIN — LISINOPRIL 40 MG: 10 TABLET ORAL at 21:00

## 2023-04-19 ASSESSMENT — PAIN - FUNCTIONAL ASSESSMENT: PAIN_FUNCTIONAL_ASSESSMENT: NONE - DENIES PAIN

## 2023-04-20 ASSESSMENT — ENCOUNTER SYMPTOMS
COLOR CHANGE: 0
VOMITING: 0
VOICE CHANGE: 0
TROUBLE SWALLOWING: 0
FACIAL SWELLING: 1
SORE THROAT: 0
SHORTNESS OF BREATH: 0
NAUSEA: 0

## 2023-04-20 NOTE — ED PROVIDER NOTES
for nausea and vomiting. Musculoskeletal:  Negative for neck pain and neck stiffness. Skin:  Negative for color change and wound. Psychiatric/Behavioral:  Negative for agitation, behavioral problems and confusion. Positives and Pertinent negatives as per HPI. SURGICAL HISTORY   History reviewed. No pertinent surgical history. [address]       Discharge Medication List as of 4/19/2023 10:57 PM        CONTINUE these medications which have NOT CHANGED    Details   metFORMIN (GLUCOPHAGE) 500 MG tablet Take 1 tablet by mouth 2 times daily (with meals), Disp-60 tablet, R-0Print      apixaban (ELIQUIS) 2.5 MG TABS tablet Take 1 tablet by mouth 2 times daily, Disp-60 tablet,R-0Print      famotidine (PEPCID) 20 MG tablet Take 1 tablet by mouth 2 times daily, Disp-60 tablet,R-0Print      vitamin C (VITAMIN C) 1000 MG tablet Take 1 tablet by mouth 3 times daily for 10 days, Disp-30 tablet,R-0OTC      Cholecalciferol (VITAMIN D) 25 MCG TABS Take 1 tablet by mouth daily, Disp-30 tablet,R-0Labeling may look different. 25 mcg=1000 Units. Please double check dosages. OTC      vitamin B-1 (THIAMINE) 100 MG tablet Take 1 tablet by mouth daily, Disp-30 tablet,R-0OTC      zinc sulfate (ORAZINC) 220 (50 Zn) MG capsule Take 1 capsule by mouth daily, Disp-30 capsule,R-0OTC      albuterol sulfate HFA (VENTOLIN HFA) 108 (90 Base) MCG/ACT inhaler Inhale 2 puffs into the lungs 4 times daily as needed for Wheezing, Disp-1 Inhaler,R-0Print             ALLERGIES     Patient has no known allergies. FAMILYHISTORY     History reviewed. No pertinent family history.      SOCIAL HISTORY       Social History     Tobacco Use    Smoking status: Former    Smokeless tobacco: Never   Substance Use Topics    Alcohol use: Not Currently    Drug use: Never       SCREENINGS        [place] Coma Scale  Eye Opening: Spontaneous  Best Verbal Response: Oriented  Best Motor Response: Obeys commands  [place] Coma Scale Score: 15

## 2023-04-20 NOTE — ED NOTES
Discharge and education instructions reviewed. Patient verbalized understanding, teach-back successful. Patient denied questions at this time. No acute distress noted. Patient instructed to follow-up as noted - return to emergency department if symptoms worsen. Patient verbalized understanding. Discharged per EDMD with discharge instructions.         Cristela De Souza RN  04/19/23 7306

## 2023-05-08 ENCOUNTER — OFFICE VISIT (OUTPATIENT)
Dept: ENT CLINIC | Age: 53
End: 2023-05-08
Payer: COMMERCIAL

## 2023-05-08 VITALS
BODY MASS INDEX: 39.1 KG/M2 | WEIGHT: 264 LBS | RESPIRATION RATE: 16 BRPM | DIASTOLIC BLOOD PRESSURE: 95 MMHG | HEART RATE: 87 BPM | SYSTOLIC BLOOD PRESSURE: 144 MMHG | HEIGHT: 69 IN | OXYGEN SATURATION: 99 %

## 2023-05-08 DIAGNOSIS — K11.20 SIALOADENITIS: Primary | ICD-10-CM

## 2023-05-08 PROCEDURE — 3077F SYST BP >= 140 MM HG: CPT | Performed by: OTOLARYNGOLOGY

## 2023-05-08 PROCEDURE — G8427 DOCREV CUR MEDS BY ELIG CLIN: HCPCS | Performed by: OTOLARYNGOLOGY

## 2023-05-08 PROCEDURE — 3017F COLORECTAL CA SCREEN DOC REV: CPT | Performed by: OTOLARYNGOLOGY

## 2023-05-08 PROCEDURE — 1036F TOBACCO NON-USER: CPT | Performed by: OTOLARYNGOLOGY

## 2023-05-08 PROCEDURE — 99203 OFFICE O/P NEW LOW 30 MIN: CPT | Performed by: OTOLARYNGOLOGY

## 2023-05-08 PROCEDURE — 3080F DIAST BP >= 90 MM HG: CPT | Performed by: OTOLARYNGOLOGY

## 2023-05-08 PROCEDURE — G8417 CALC BMI ABV UP PARAM F/U: HCPCS | Performed by: OTOLARYNGOLOGY

## 2023-05-08 NOTE — PROGRESS NOTES
[facility]      Patient Name: Darnell Bliss  Medical Record Number:  [id number]  Primary Care Physician:  Denia Brown MD  Date of Consultation: 5/8/2023    Chief Complaint: Neck swelling        HISTORY OF PRESENT ILLNESS  Hiwot Leung is a(n) 48 y.o. male who presents for evaluation of neck swelling. The patient said that few weeks ago he had some swelling on the right side. It seemed to get worse when he tried to eat. It was uncomfortable. He was seen in the emergency room and given antibiotics. He said that it has gotten better. He has never had anything like this before. He is diabetic and admits that it has not been under great control. In addition he does remember being fairly dry and dehydrated that day. Denies history of rheumatologic problems. REVIEW OF SYSTEMS  As above    PHYSICAL EXAM  GENERAL: No Acute Distress, Alert and Oriented, no Hoarseness, strong voice  EYES: EOMI, Anti-icteric  HENT:   Head: Normocephalic and atraumatic. Face:  Symmetric, facial nerve intact, no sinus tenderness  Right Ear: Normal external ear, normal external auditory canal, intact tympanic membrane with normal mobility and aerated middle ear  Left Ear: Normal external ear, normal external auditory canal, intact tympanic membrane with normal mobility and aerated middle ear  Mouth/Oral Cavity:  normal lips, Uvula is midline, no mucosal lesions, no trismus,   Oropharynx/Larynx:  normal oropharynx  Nose:Normal external nasal appearance. NECK: Normal range of motion, no thyromegaly, trachea is midline, no lymphadenopathy, no neck masses, no crepitus        RADIOLOGY  Summary of findings:  I reviewed the CT scan of the neck on April 19, 2023. He does have right greater than left enlargement of the submandibular gland. There is no abscess. No other masses noted. ASSESSMENT/PLAN  1. Sialoadenitis  The patient had a sialoadenitis.   He does